# Patient Record
Sex: MALE | Race: OTHER | HISPANIC OR LATINO | URBAN - METROPOLITAN AREA
[De-identification: names, ages, dates, MRNs, and addresses within clinical notes are randomized per-mention and may not be internally consistent; named-entity substitution may affect disease eponyms.]

---

## 2020-01-01 ENCOUNTER — INPATIENT (INPATIENT)
Facility: HOSPITAL | Age: 65
LOS: 5 days | DRG: 870 | End: 2020-03-27
Attending: SURGERY | Admitting: SURGERY
Payer: MEDICAID

## 2020-01-01 VITALS
OXYGEN SATURATION: 92 % | HEIGHT: 68 IN | SYSTOLIC BLOOD PRESSURE: 155 MMHG | DIASTOLIC BLOOD PRESSURE: 76 MMHG | WEIGHT: 160.06 LBS | TEMPERATURE: 100 F | RESPIRATION RATE: 36 BRPM | HEART RATE: 98 BPM

## 2020-01-01 VITALS — TEMPERATURE: 101 F

## 2020-01-01 DIAGNOSIS — E44.0 MODERATE PROTEIN-CALORIE MALNUTRITION: ICD-10-CM

## 2020-01-01 DIAGNOSIS — J96.01 ACUTE RESPIRATORY FAILURE WITH HYPOXIA: ICD-10-CM

## 2020-01-01 DIAGNOSIS — N17.9 ACUTE KIDNEY FAILURE, UNSPECIFIED: ICD-10-CM

## 2020-01-01 DIAGNOSIS — J18.9 PNEUMONIA, UNSPECIFIED ORGANISM: ICD-10-CM

## 2020-01-01 DIAGNOSIS — A41.9 SEPSIS, UNSPECIFIED ORGANISM: ICD-10-CM

## 2020-01-01 DIAGNOSIS — Z51.5 ENCOUNTER FOR PALLIATIVE CARE: ICD-10-CM

## 2020-01-01 DIAGNOSIS — Z29.9 ENCOUNTER FOR PROPHYLACTIC MEASURES, UNSPECIFIED: ICD-10-CM

## 2020-01-01 DIAGNOSIS — Z71.89 OTHER SPECIFIED COUNSELING: ICD-10-CM

## 2020-01-01 DIAGNOSIS — R53.81 OTHER MALAISE: ICD-10-CM

## 2020-01-01 DIAGNOSIS — I50.9 HEART FAILURE, UNSPECIFIED: ICD-10-CM

## 2020-01-01 DIAGNOSIS — E43 UNSPECIFIED SEVERE PROTEIN-CALORIE MALNUTRITION: ICD-10-CM

## 2020-01-01 LAB
4/8 RATIO: 5.71 RATIO — HIGH (ref 0.9–3.6)
ABS CD8: 50 /UL — LOW (ref 142–740)
ALBUMIN SERPL ELPH-MCNC: 1.2 G/DL — LOW (ref 3.5–5)
ALBUMIN SERPL ELPH-MCNC: 1.5 G/DL — LOW (ref 3.5–5)
ALBUMIN SERPL ELPH-MCNC: 1.6 G/DL — LOW (ref 3.5–5)
ALBUMIN SERPL ELPH-MCNC: 1.6 G/DL — LOW (ref 3.5–5)
ALBUMIN SERPL ELPH-MCNC: 1.9 G/DL — LOW (ref 3.5–5)
ALBUMIN SERPL ELPH-MCNC: 2.6 G/DL — LOW (ref 3.5–5)
ALP SERPL-CCNC: 163 U/L — HIGH (ref 40–120)
ALP SERPL-CCNC: 175 U/L — HIGH (ref 40–120)
ALP SERPL-CCNC: 184 U/L — HIGH (ref 40–120)
ALP SERPL-CCNC: 188 U/L — HIGH (ref 40–120)
ALP SERPL-CCNC: 213 U/L — HIGH (ref 40–120)
ALP SERPL-CCNC: 68 U/L — SIGNIFICANT CHANGE UP (ref 40–120)
ALT FLD-CCNC: 1042 U/L DA — HIGH (ref 10–60)
ALT FLD-CCNC: 1648 U/L DA — HIGH (ref 10–60)
ALT FLD-CCNC: 25 U/L DA — SIGNIFICANT CHANGE UP (ref 10–60)
ALT FLD-CCNC: 2807 U/L DA — HIGH (ref 10–60)
ALT FLD-CCNC: 760 U/L DA — HIGH (ref 10–60)
ALT FLD-CCNC: 803 U/L DA — HIGH (ref 10–60)
ANION GAP SERPL CALC-SCNC: 10 MMOL/L — SIGNIFICANT CHANGE UP (ref 5–17)
ANION GAP SERPL CALC-SCNC: 12 MMOL/L — SIGNIFICANT CHANGE UP (ref 5–17)
ANION GAP SERPL CALC-SCNC: 13 MMOL/L — SIGNIFICANT CHANGE UP (ref 5–17)
ANION GAP SERPL CALC-SCNC: 16 MMOL/L — SIGNIFICANT CHANGE UP (ref 5–17)
ANION GAP SERPL CALC-SCNC: 18 MMOL/L — HIGH (ref 5–17)
ANION GAP SERPL CALC-SCNC: 20 MMOL/L — HIGH (ref 5–17)
ANION GAP SERPL CALC-SCNC: 29 MMOL/L — HIGH (ref 5–17)
ANION GAP SERPL CALC-SCNC: 9 MMOL/L — SIGNIFICANT CHANGE UP (ref 5–17)
APPEARANCE UR: CLEAR — SIGNIFICANT CHANGE UP
APTT BLD: 29.8 SEC — SIGNIFICANT CHANGE UP (ref 27.5–36.3)
APTT BLD: 29.9 SEC — SIGNIFICANT CHANGE UP (ref 27.5–36.3)
APTT BLD: 34.9 SEC — SIGNIFICANT CHANGE UP (ref 27.5–36.3)
AST SERPL-CCNC: 1070 U/L — HIGH (ref 10–40)
AST SERPL-CCNC: 300 U/L — HIGH (ref 10–40)
AST SERPL-CCNC: 32 U/L — SIGNIFICANT CHANGE UP (ref 10–40)
AST SERPL-CCNC: 321 U/L — HIGH (ref 10–40)
AST SERPL-CCNC: 5550 U/L — HIGH (ref 10–40)
AST SERPL-CCNC: 829 U/L — HIGH (ref 10–40)
BASE EXCESS BLDA CALC-SCNC: -10.2 MMOL/L — LOW (ref -2–2)
BASE EXCESS BLDA CALC-SCNC: -10.6 MMOL/L — LOW (ref -2–2)
BASE EXCESS BLDA CALC-SCNC: -10.7 MMOL/L — LOW (ref -2–2)
BASE EXCESS BLDA CALC-SCNC: -10.7 MMOL/L — LOW (ref -2–2)
BASE EXCESS BLDA CALC-SCNC: -11.7 MMOL/L — LOW (ref -2–2)
BASE EXCESS BLDA CALC-SCNC: -12.3 MMOL/L — LOW (ref -2–2)
BASE EXCESS BLDA CALC-SCNC: -7.9 MMOL/L — LOW (ref -2–2)
BASE EXCESS BLDA CALC-SCNC: -9.1 MMOL/L — LOW (ref -2–2)
BASE EXCESS BLDA CALC-SCNC: -9.6 MMOL/L — LOW (ref -2–2)
BASE EXCESS BLDA CALC-SCNC: -9.7 MMOL/L — LOW (ref -2–2)
BASE EXCESS BLDV CALC-SCNC: -14 MMOL/L — LOW (ref -2–2)
BASE EXCESS BLDV CALC-SCNC: 3.8 MMOL/L — HIGH (ref -2–2)
BASOPHILS # BLD AUTO: 0 K/UL — SIGNIFICANT CHANGE UP (ref 0–0.2)
BASOPHILS # BLD AUTO: 0 K/UL — SIGNIFICANT CHANGE UP (ref 0–0.2)
BASOPHILS # BLD AUTO: 0.01 K/UL — SIGNIFICANT CHANGE UP (ref 0–0.2)
BASOPHILS # BLD AUTO: 0.01 K/UL — SIGNIFICANT CHANGE UP (ref 0–0.2)
BASOPHILS # BLD AUTO: SIGNIFICANT CHANGE UP K/UL (ref 0–0.2)
BASOPHILS # BLD AUTO: SIGNIFICANT CHANGE UP K/UL (ref 0–0.2)
BASOPHILS NFR BLD AUTO: 0 % — SIGNIFICANT CHANGE UP (ref 0–2)
BASOPHILS NFR BLD AUTO: 0 % — SIGNIFICANT CHANGE UP (ref 0–2)
BASOPHILS NFR BLD AUTO: 0.1 % — SIGNIFICANT CHANGE UP (ref 0–2)
BASOPHILS NFR BLD AUTO: 0.1 % — SIGNIFICANT CHANGE UP (ref 0–2)
BASOPHILS NFR BLD AUTO: SIGNIFICANT CHANGE UP % (ref 0–2)
BASOPHILS NFR BLD AUTO: SIGNIFICANT CHANGE UP % (ref 0–2)
BILIRUB SERPL-MCNC: 0.4 MG/DL — SIGNIFICANT CHANGE UP (ref 0.2–1.2)
BILIRUB SERPL-MCNC: 0.5 MG/DL — SIGNIFICANT CHANGE UP (ref 0.2–1.2)
BILIRUB SERPL-MCNC: 0.5 MG/DL — SIGNIFICANT CHANGE UP (ref 0.2–1.2)
BILIRUB SERPL-MCNC: 0.6 MG/DL — SIGNIFICANT CHANGE UP (ref 0.2–1.2)
BILIRUB SERPL-MCNC: 0.8 MG/DL — SIGNIFICANT CHANGE UP (ref 0.2–1.2)
BILIRUB SERPL-MCNC: 0.9 MG/DL — SIGNIFICANT CHANGE UP (ref 0.2–1.2)
BILIRUB UR-MCNC: NEGATIVE — SIGNIFICANT CHANGE UP
BLOOD GAS COMMENTS ARTERIAL: SIGNIFICANT CHANGE UP
BLOOD GAS COMMENTS, VENOUS: SIGNIFICANT CHANGE UP
BLOOD GAS COMMENTS, VENOUS: SIGNIFICANT CHANGE UP
BUN SERPL-MCNC: 18 MG/DL — SIGNIFICANT CHANGE UP (ref 7–18)
BUN SERPL-MCNC: 21 MG/DL — HIGH (ref 7–18)
BUN SERPL-MCNC: 36 MG/DL — HIGH (ref 7–18)
BUN SERPL-MCNC: 39 MG/DL — HIGH (ref 7–18)
BUN SERPL-MCNC: 55 MG/DL — HIGH (ref 7–18)
BUN SERPL-MCNC: 57 MG/DL — HIGH (ref 7–18)
BUN SERPL-MCNC: 71 MG/DL — HIGH (ref 7–18)
BUN SERPL-MCNC: 75 MG/DL — HIGH (ref 7–18)
CALCIUM SERPL-MCNC: 10.3 MG/DL — SIGNIFICANT CHANGE UP (ref 8.4–10.5)
CALCIUM SERPL-MCNC: 6.6 MG/DL — LOW (ref 8.4–10.5)
CALCIUM SERPL-MCNC: 6.6 MG/DL — LOW (ref 8.4–10.5)
CALCIUM SERPL-MCNC: 6.7 MG/DL — LOW (ref 8.4–10.5)
CALCIUM SERPL-MCNC: 6.7 MG/DL — LOW (ref 8.4–10.5)
CALCIUM SERPL-MCNC: 7 MG/DL — LOW (ref 8.4–10.5)
CALCIUM SERPL-MCNC: 7 MG/DL — LOW (ref 8.4–10.5)
CALCIUM SERPL-MCNC: 8.5 MG/DL — SIGNIFICANT CHANGE UP (ref 8.4–10.5)
CD3 BLASTS SPEC-ACNC: 360 /UL — LOW (ref 672–1870)
CD3 BLASTS SPEC-ACNC: 71 % — SIGNIFICANT CHANGE UP (ref 59–83)
CD4 %: 57 % — SIGNIFICANT CHANGE UP (ref 30–62)
CD8 %: 10 % — LOW (ref 12–36)
CHLORIDE SERPL-SCNC: 100 MMOL/L — SIGNIFICANT CHANGE UP (ref 96–108)
CHLORIDE SERPL-SCNC: 101 MMOL/L — SIGNIFICANT CHANGE UP (ref 96–108)
CHLORIDE SERPL-SCNC: 104 MMOL/L — SIGNIFICANT CHANGE UP (ref 96–108)
CHLORIDE SERPL-SCNC: 108 MMOL/L — SIGNIFICANT CHANGE UP (ref 96–108)
CHLORIDE SERPL-SCNC: 115 MMOL/L — HIGH (ref 96–108)
CHLORIDE SERPL-SCNC: 116 MMOL/L — HIGH (ref 96–108)
CHLORIDE SERPL-SCNC: 116 MMOL/L — HIGH (ref 96–108)
CHLORIDE SERPL-SCNC: 98 MMOL/L — SIGNIFICANT CHANGE UP (ref 96–108)
CHOLEST SERPL-MCNC: 108 MG/DL — SIGNIFICANT CHANGE UP (ref 10–199)
CK MB BLD-MCNC: 1.1 % — SIGNIFICANT CHANGE UP (ref 0–3.5)
CK MB BLD-MCNC: 1.3 % — SIGNIFICANT CHANGE UP (ref 0–3.5)
CK MB CFR SERPL CALC: 11.3 NG/ML — HIGH (ref 0–3.6)
CK MB CFR SERPL CALC: 9.1 NG/ML — HIGH (ref 0–3.6)
CK SERPL-CCNC: 250 U/L — HIGH (ref 35–232)
CK SERPL-CCNC: 815 U/L — HIGH (ref 35–232)
CK SERPL-CCNC: 832 U/L — HIGH (ref 35–232)
CK SERPL-CCNC: 876 U/L — HIGH (ref 35–232)
CO2 SERPL-SCNC: 15 MMOL/L — LOW (ref 22–31)
CO2 SERPL-SCNC: 16 MMOL/L — LOW (ref 22–31)
CO2 SERPL-SCNC: 17 MMOL/L — LOW (ref 22–31)
CO2 SERPL-SCNC: 18 MMOL/L — LOW (ref 22–31)
CO2 SERPL-SCNC: 27 MMOL/L — SIGNIFICANT CHANGE UP (ref 22–31)
CO2 SERPL-SCNC: 9 MMOL/L — CRITICAL LOW (ref 22–31)
COLOR SPEC: YELLOW — SIGNIFICANT CHANGE UP
CREAT ?TM UR-MCNC: 209 MG/DL — SIGNIFICANT CHANGE UP
CREAT SERPL-MCNC: 1.06 MG/DL — SIGNIFICANT CHANGE UP (ref 0.5–1.3)
CREAT SERPL-MCNC: 1.77 MG/DL — HIGH (ref 0.5–1.3)
CREAT SERPL-MCNC: 3.8 MG/DL — HIGH (ref 0.5–1.3)
CREAT SERPL-MCNC: 4.83 MG/DL — HIGH (ref 0.5–1.3)
CREAT SERPL-MCNC: 7.3 MG/DL — HIGH (ref 0.5–1.3)
CREAT SERPL-MCNC: 7.56 MG/DL — HIGH (ref 0.5–1.3)
CREAT SERPL-MCNC: 9.44 MG/DL — HIGH (ref 0.5–1.3)
CREAT SERPL-MCNC: 9.78 MG/DL — HIGH (ref 0.5–1.3)
CRP SERPL-MCNC: 36.39 MG/DL — HIGH (ref 0–0.4)
CRP SERPL-MCNC: 36.82 MG/DL — HIGH (ref 0–0.4)
CRP SERPL-MCNC: 49.23 MG/DL — HIGH (ref 0–0.4)
CRP SERPL-MCNC: 50.72 MG/DL — HIGH (ref 0–0.4)
CULTURE RESULTS: SIGNIFICANT CHANGE UP
D DIMER BLD IA.RAPID-MCNC: 8250 NG/ML DDU — HIGH
DIFF PNL FLD: ABNORMAL
EOSINOPHIL # BLD AUTO: 0 K/UL — SIGNIFICANT CHANGE UP (ref 0–0.5)
EOSINOPHIL # BLD AUTO: 0 K/UL — SIGNIFICANT CHANGE UP (ref 0–0.5)
EOSINOPHIL # BLD AUTO: 0.01 K/UL — SIGNIFICANT CHANGE UP (ref 0–0.5)
EOSINOPHIL # BLD AUTO: 0.08 K/UL — SIGNIFICANT CHANGE UP (ref 0–0.5)
EOSINOPHIL # BLD AUTO: SIGNIFICANT CHANGE UP K/UL (ref 0–0.5)
EOSINOPHIL # BLD AUTO: SIGNIFICANT CHANGE UP K/UL (ref 0–0.5)
EOSINOPHIL NFR BLD AUTO: 0 % — SIGNIFICANT CHANGE UP (ref 0–6)
EOSINOPHIL NFR BLD AUTO: 0 % — SIGNIFICANT CHANGE UP (ref 0–6)
EOSINOPHIL NFR BLD AUTO: 0.1 % — SIGNIFICANT CHANGE UP (ref 0–6)
EOSINOPHIL NFR BLD AUTO: 0.9 % — SIGNIFICANT CHANGE UP (ref 0–6)
EOSINOPHIL NFR BLD AUTO: SIGNIFICANT CHANGE UP % (ref 0–6)
EOSINOPHIL NFR BLD AUTO: SIGNIFICANT CHANGE UP % (ref 0–6)
ERYTHROCYTE [SEDIMENTATION RATE] IN BLOOD: 86 MM/HR — HIGH (ref 0–20)
FERRITIN SERPL-MCNC: 8287 NG/ML — HIGH (ref 30–400)
FERRITIN SERPL-MCNC: HIGH NG/ML (ref 30–400)
FLU A RESULT: SIGNIFICANT CHANGE UP
FLU A RESULT: SIGNIFICANT CHANGE UP
FLUAV AG NPH QL: SIGNIFICANT CHANGE UP
FLUBV AG NPH QL: SIGNIFICANT CHANGE UP
FOLATE SERPL-MCNC: >20 NG/ML — SIGNIFICANT CHANGE UP
GLUCOSE BLDC GLUCOMTR-MCNC: 107 MG/DL — HIGH (ref 70–99)
GLUCOSE BLDC GLUCOMTR-MCNC: 180 MG/DL — HIGH (ref 70–99)
GLUCOSE SERPL-MCNC: 103 MG/DL — HIGH (ref 70–99)
GLUCOSE SERPL-MCNC: 123 MG/DL — HIGH (ref 70–99)
GLUCOSE SERPL-MCNC: 130 MG/DL — HIGH (ref 70–99)
GLUCOSE SERPL-MCNC: 132 MG/DL — HIGH (ref 70–99)
GLUCOSE SERPL-MCNC: 139 MG/DL — HIGH (ref 70–99)
GLUCOSE SERPL-MCNC: 141 MG/DL — HIGH (ref 70–99)
GLUCOSE SERPL-MCNC: 145 MG/DL — HIGH (ref 70–99)
GLUCOSE SERPL-MCNC: 61 MG/DL — LOW (ref 70–99)
GLUCOSE UR QL: NEGATIVE — SIGNIFICANT CHANGE UP
HBA1C BLD-MCNC: 6.5 % — HIGH (ref 4–5.6)
HBV SURFACE AG SER-ACNC: SIGNIFICANT CHANGE UP
HCO3 BLDA-SCNC: 15 MMOL/L — LOW (ref 23–27)
HCO3 BLDA-SCNC: 16 MMOL/L — LOW (ref 23–27)
HCO3 BLDA-SCNC: 16 MMOL/L — LOW (ref 23–27)
HCO3 BLDA-SCNC: 17 MMOL/L — LOW (ref 23–27)
HCO3 BLDA-SCNC: 18 MMOL/L — LOW (ref 23–27)
HCO3 BLDV-SCNC: 11 MMOL/L — LOW (ref 21–29)
HCO3 BLDV-SCNC: 28 MMOL/L — SIGNIFICANT CHANGE UP (ref 21–29)
HCT VFR BLD CALC: 35.1 % — LOW (ref 39–50)
HCT VFR BLD CALC: 36.9 % — LOW (ref 39–50)
HCT VFR BLD CALC: 37.8 % — LOW (ref 39–50)
HCT VFR BLD CALC: 39 % — SIGNIFICANT CHANGE UP (ref 39–50)
HCT VFR BLD CALC: 40.8 % — SIGNIFICANT CHANGE UP (ref 39–50)
HCT VFR BLD CALC: 41.8 % — SIGNIFICANT CHANGE UP (ref 39–50)
HCT VFR BLD CALC: 42.6 % — SIGNIFICANT CHANGE UP (ref 39–50)
HDLC SERPL-MCNC: 37 MG/DL — LOW
HGB BLD-MCNC: 10.6 G/DL — LOW (ref 13–17)
HGB BLD-MCNC: 11.5 G/DL — LOW (ref 13–17)
HGB BLD-MCNC: 11.7 G/DL — LOW (ref 13–17)
HGB BLD-MCNC: 12.7 G/DL — LOW (ref 13–17)
HGB BLD-MCNC: 12.8 G/DL — LOW (ref 13–17)
HGB BLD-MCNC: 13 G/DL — SIGNIFICANT CHANGE UP (ref 13–17)
HGB BLD-MCNC: 13.4 G/DL — SIGNIFICANT CHANGE UP (ref 13–17)
HOROWITZ INDEX BLDA+IHG-RTO: 100 — SIGNIFICANT CHANGE UP
HOROWITZ INDEX BLDA+IHG-RTO: 40 — SIGNIFICANT CHANGE UP
HOROWITZ INDEX BLDA+IHG-RTO: 50 — SIGNIFICANT CHANGE UP
HOROWITZ INDEX BLDA+IHG-RTO: 50 — SIGNIFICANT CHANGE UP
HOROWITZ INDEX BLDA+IHG-RTO: 60 — SIGNIFICANT CHANGE UP
HOROWITZ INDEX BLDV+IHG-RTO: 100 — SIGNIFICANT CHANGE UP
HOROWITZ INDEX BLDV+IHG-RTO: 21 — SIGNIFICANT CHANGE UP
IMM GRANULOCYTES NFR BLD AUTO: 0.7 % — SIGNIFICANT CHANGE UP (ref 0–1.5)
IMM GRANULOCYTES NFR BLD AUTO: 1.4 % — SIGNIFICANT CHANGE UP (ref 0–1.5)
IMM GRANULOCYTES NFR BLD AUTO: SIGNIFICANT CHANGE UP % (ref 0–1.5)
IMM GRANULOCYTES NFR BLD AUTO: SIGNIFICANT CHANGE UP % (ref 0–1.5)
INR BLD: 1.22 RATIO — HIGH (ref 0.88–1.16)
INR BLD: 1.29 RATIO — HIGH (ref 0.88–1.16)
INR BLD: 1.63 RATIO — HIGH (ref 0.88–1.16)
KETONES UR-MCNC: ABNORMAL
LACTATE SERPL-SCNC: 1.3 MMOL/L — SIGNIFICANT CHANGE UP (ref 0.7–2)
LACTATE SERPL-SCNC: 11.8 MMOL/L — CRITICAL HIGH (ref 0.7–2)
LACTATE SERPL-SCNC: 2.9 MMOL/L — HIGH (ref 0.7–2)
LDH SERPL L TO P-CCNC: 788 U/L — HIGH (ref 120–225)
LDH SERPL L TO P-CCNC: >4000 U/L — HIGH (ref 120–225)
LEGIONELLA AG UR QL: NEGATIVE — SIGNIFICANT CHANGE UP
LEUKOCYTE ESTERASE UR-ACNC: NEGATIVE — SIGNIFICANT CHANGE UP
LIPID PNL WITH DIRECT LDL SERPL: 52 MG/DL — SIGNIFICANT CHANGE UP
LYMPHOCYTES # BLD AUTO: 0.31 K/UL — LOW (ref 1–3.3)
LYMPHOCYTES # BLD AUTO: 0.64 K/UL — LOW (ref 1–3.3)
LYMPHOCYTES # BLD AUTO: 0.82 K/UL — LOW (ref 1–3.3)
LYMPHOCYTES # BLD AUTO: 1.75 K/UL — SIGNIFICANT CHANGE UP (ref 1–3.3)
LYMPHOCYTES # BLD AUTO: 17 % — SIGNIFICANT CHANGE UP (ref 13–44)
LYMPHOCYTES # BLD AUTO: 3 % — LOW (ref 13–44)
LYMPHOCYTES # BLD AUTO: 7 % — LOW (ref 13–44)
LYMPHOCYTES # BLD AUTO: 8.8 % — LOW (ref 13–44)
LYMPHOCYTES # BLD AUTO: SIGNIFICANT CHANGE UP % (ref 13–44)
LYMPHOCYTES # BLD AUTO: SIGNIFICANT CHANGE UP % (ref 13–44)
LYMPHOCYTES # BLD AUTO: SIGNIFICANT CHANGE UP K/UL (ref 1–3.3)
LYMPHOCYTES # BLD AUTO: SIGNIFICANT CHANGE UP K/UL (ref 1–3.3)
M PNEUMO IGM SER-ACNC: 61 UNITS/ML — SIGNIFICANT CHANGE UP
MAGNESIUM SERPL-MCNC: 2 MG/DL — SIGNIFICANT CHANGE UP (ref 1.6–2.6)
MAGNESIUM SERPL-MCNC: 2.2 MG/DL — SIGNIFICANT CHANGE UP (ref 1.6–2.6)
MAGNESIUM SERPL-MCNC: 2.3 MG/DL — SIGNIFICANT CHANGE UP (ref 1.6–2.6)
MAGNESIUM SERPL-MCNC: 2.4 MG/DL — SIGNIFICANT CHANGE UP (ref 1.6–2.6)
MAGNESIUM SERPL-MCNC: 2.5 MG/DL — SIGNIFICANT CHANGE UP (ref 1.6–2.6)
MAGNESIUM SERPL-MCNC: 3.2 MG/DL — HIGH (ref 1.6–2.6)
MAGNESIUM SERPL-MCNC: 4.1 MG/DL — HIGH (ref 1.6–2.6)
MANUAL SMEAR VERIFICATION: SIGNIFICANT CHANGE UP
MCHC RBC-ENTMCNC: 29.8 GM/DL — LOW (ref 32–36)
MCHC RBC-ENTMCNC: 30.2 GM/DL — LOW (ref 32–36)
MCHC RBC-ENTMCNC: 30.4 GM/DL — LOW (ref 32–36)
MCHC RBC-ENTMCNC: 30.9 PG — SIGNIFICANT CHANGE UP (ref 27–34)
MCHC RBC-ENTMCNC: 31.1 GM/DL — LOW (ref 32–36)
MCHC RBC-ENTMCNC: 31.4 PG — SIGNIFICANT CHANGE UP (ref 27–34)
MCHC RBC-ENTMCNC: 31.6 PG — SIGNIFICANT CHANGE UP (ref 27–34)
MCHC RBC-ENTMCNC: 31.7 GM/DL — LOW (ref 32–36)
MCHC RBC-ENTMCNC: 31.7 PG — SIGNIFICANT CHANGE UP (ref 27–34)
MCHC RBC-ENTMCNC: 31.9 PG — SIGNIFICANT CHANGE UP (ref 27–34)
MCHC RBC-ENTMCNC: 32.1 PG — SIGNIFICANT CHANGE UP (ref 27–34)
MCHC RBC-ENTMCNC: 32.3 PG — SIGNIFICANT CHANGE UP (ref 27–34)
MCHC RBC-ENTMCNC: 32.8 GM/DL — SIGNIFICANT CHANGE UP (ref 32–36)
MCHC RBC-ENTMCNC: 32.8 GM/DL — SIGNIFICANT CHANGE UP (ref 32–36)
MCV RBC AUTO: 101.7 FL — HIGH (ref 80–100)
MCV RBC AUTO: 101.9 FL — HIGH (ref 80–100)
MCV RBC AUTO: 103.3 FL — HIGH (ref 80–100)
MCV RBC AUTO: 103.6 FL — HIGH (ref 80–100)
MCV RBC AUTO: 105.7 FL — HIGH (ref 80–100)
MCV RBC AUTO: 96.5 FL — SIGNIFICANT CHANGE UP (ref 80–100)
MCV RBC AUTO: 97.7 FL — SIGNIFICANT CHANGE UP (ref 80–100)
MONOCYTES # BLD AUTO: 0.21 K/UL — SIGNIFICANT CHANGE UP (ref 0–0.9)
MONOCYTES # BLD AUTO: 0.31 K/UL — SIGNIFICANT CHANGE UP (ref 0–0.9)
MONOCYTES # BLD AUTO: 0.43 K/UL — SIGNIFICANT CHANGE UP (ref 0–0.9)
MONOCYTES # BLD AUTO: 0.51 K/UL — SIGNIFICANT CHANGE UP (ref 0–0.9)
MONOCYTES # BLD AUTO: SIGNIFICANT CHANGE UP K/UL (ref 0–0.9)
MONOCYTES # BLD AUTO: SIGNIFICANT CHANGE UP K/UL (ref 0–0.9)
MONOCYTES NFR BLD AUTO: 2 % — SIGNIFICANT CHANGE UP (ref 2–14)
MONOCYTES NFR BLD AUTO: 3.3 % — SIGNIFICANT CHANGE UP (ref 2–14)
MONOCYTES NFR BLD AUTO: 4.7 % — SIGNIFICANT CHANGE UP (ref 2–14)
MONOCYTES NFR BLD AUTO: 5 % — SIGNIFICANT CHANGE UP (ref 2–14)
MONOCYTES NFR BLD AUTO: SIGNIFICANT CHANGE UP % (ref 2–14)
MONOCYTES NFR BLD AUTO: SIGNIFICANT CHANGE UP % (ref 2–14)
MRSA PCR RESULT.: SIGNIFICANT CHANGE UP
MYCOPLASMA AG SPEC QL: NEGATIVE — SIGNIFICANT CHANGE UP
NEUTROPHILS # BLD AUTO: 8 K/UL — HIGH (ref 1.8–7.4)
NEUTROPHILS # BLD AUTO: 8.01 K/UL — HIGH (ref 1.8–7.4)
NEUTROPHILS # BLD AUTO: 8.03 K/UL — HIGH (ref 1.8–7.4)
NEUTROPHILS # BLD AUTO: 9.81 K/UL — HIGH (ref 1.8–7.4)
NEUTROPHILS # BLD AUTO: SIGNIFICANT CHANGE UP K/UL (ref 1.8–7.4)
NEUTROPHILS # BLD AUTO: SIGNIFICANT CHANGE UP K/UL (ref 1.8–7.4)
NEUTROPHILS NFR BLD AUTO: 78 % — HIGH (ref 43–77)
NEUTROPHILS NFR BLD AUTO: 85.5 % — HIGH (ref 43–77)
NEUTROPHILS NFR BLD AUTO: 87.4 % — HIGH (ref 43–77)
NEUTROPHILS NFR BLD AUTO: 93 % — HIGH (ref 43–77)
NEUTROPHILS NFR BLD AUTO: SIGNIFICANT CHANGE UP % (ref 43–77)
NEUTROPHILS NFR BLD AUTO: SIGNIFICANT CHANGE UP % (ref 43–77)
NITRITE UR-MCNC: NEGATIVE — SIGNIFICANT CHANGE UP
NRBC # BLD: 0 /100 WBCS — SIGNIFICANT CHANGE UP (ref 0–0)
NRBC # BLD: 0 /100 — SIGNIFICANT CHANGE UP (ref 0–0)
NRBC # BLD: 1 /100 WBCS — HIGH (ref 0–0)
NRBC # BLD: SIGNIFICANT CHANGE UP /100 WBCS (ref 0–0)
NT-PROBNP SERPL-SCNC: 431 PG/ML — HIGH (ref 0–125)
OSMOLALITY UR: 324 MOS/KG — SIGNIFICANT CHANGE UP (ref 50–1200)
PCO2 BLDA: 34 MMHG — SIGNIFICANT CHANGE UP (ref 32–46)
PCO2 BLDA: 38 MMHG — SIGNIFICANT CHANGE UP (ref 32–46)
PCO2 BLDA: 39 MMHG — SIGNIFICANT CHANGE UP (ref 32–46)
PCO2 BLDA: 39 MMHG — SIGNIFICANT CHANGE UP (ref 32–46)
PCO2 BLDA: 40 MMHG — SIGNIFICANT CHANGE UP (ref 32–46)
PCO2 BLDA: 40 MMHG — SIGNIFICANT CHANGE UP (ref 32–46)
PCO2 BLDA: 41 MMHG — SIGNIFICANT CHANGE UP (ref 32–46)
PCO2 BLDA: 45 MMHG — SIGNIFICANT CHANGE UP (ref 32–46)
PCO2 BLDV: 24 MMHG — LOW (ref 35–50)
PCO2 BLDV: 40 MMHG — SIGNIFICANT CHANGE UP (ref 35–50)
PH BLDA: 7.18 — CRITICAL LOW (ref 7.35–7.45)
PH BLDA: 7.19 — CRITICAL LOW (ref 7.35–7.45)
PH BLDA: 7.24 — LOW (ref 7.35–7.45)
PH BLDA: 7.24 — LOW (ref 7.35–7.45)
PH BLDA: 7.25 — LOW (ref 7.35–7.45)
PH BLDA: 7.26 — LOW (ref 7.35–7.45)
PH BLDA: 7.27 — LOW (ref 7.35–7.45)
PH BLDA: 7.27 — LOW (ref 7.35–7.45)
PH BLDA: 7.28 — LOW (ref 7.35–7.45)
PH BLDA: 7.28 — LOW (ref 7.35–7.45)
PH BLDV: 7.29 — LOW (ref 7.35–7.45)
PH BLDV: 7.45 — SIGNIFICANT CHANGE UP (ref 7.35–7.45)
PH UR: 5 — SIGNIFICANT CHANGE UP (ref 5–8)
PHOSPHATE SERPL-MCNC: 15.7 MG/DL — HIGH (ref 2.5–4.5)
PHOSPHATE SERPL-MCNC: 5.6 MG/DL — HIGH (ref 2.5–4.5)
PHOSPHATE SERPL-MCNC: 6.3 MG/DL — HIGH (ref 2.5–4.5)
PHOSPHATE SERPL-MCNC: 7.2 MG/DL — HIGH (ref 2.5–4.5)
PLAT MORPH BLD: NORMAL — SIGNIFICANT CHANGE UP
PLATELET # BLD AUTO: 189 K/UL — SIGNIFICANT CHANGE UP (ref 150–400)
PLATELET # BLD AUTO: 196 K/UL — SIGNIFICANT CHANGE UP (ref 150–400)
PLATELET # BLD AUTO: 212 K/UL — SIGNIFICANT CHANGE UP (ref 150–400)
PLATELET # BLD AUTO: 213 K/UL — SIGNIFICANT CHANGE UP (ref 150–400)
PLATELET # BLD AUTO: 214 K/UL — SIGNIFICANT CHANGE UP (ref 150–400)
PLATELET # BLD AUTO: 215 K/UL — SIGNIFICANT CHANGE UP (ref 150–400)
PLATELET # BLD AUTO: 223 K/UL — SIGNIFICANT CHANGE UP (ref 150–400)
PO2 BLDA: 110 MMHG — HIGH (ref 74–108)
PO2 BLDA: 133 MMHG — HIGH (ref 74–108)
PO2 BLDA: 133 MMHG — HIGH (ref 74–108)
PO2 BLDA: 167 MMHG — HIGH (ref 74–108)
PO2 BLDA: 175 MMHG — HIGH (ref 74–108)
PO2 BLDA: 214 MMHG — HIGH (ref 74–108)
PO2 BLDA: 218 MMHG — HIGH (ref 74–108)
PO2 BLDA: 235 MMHG — HIGH (ref 74–108)
PO2 BLDA: 83 MMHG — SIGNIFICANT CHANGE UP (ref 74–108)
PO2 BLDA: 91 MMHG — SIGNIFICANT CHANGE UP (ref 74–108)
PO2 BLDV: 41 MMHG — SIGNIFICANT CHANGE UP (ref 25–45)
PO2 BLDV: 47 MMHG — HIGH (ref 25–45)
POTASSIUM SERPL-MCNC: 4 MMOL/L — SIGNIFICANT CHANGE UP (ref 3.5–5.3)
POTASSIUM SERPL-MCNC: 4.5 MMOL/L — SIGNIFICANT CHANGE UP (ref 3.5–5.3)
POTASSIUM SERPL-MCNC: 5.2 MMOL/L — SIGNIFICANT CHANGE UP (ref 3.5–5.3)
POTASSIUM SERPL-MCNC: 5.4 MMOL/L — HIGH (ref 3.5–5.3)
POTASSIUM SERPL-MCNC: 5.4 MMOL/L — HIGH (ref 3.5–5.3)
POTASSIUM SERPL-MCNC: 5.9 MMOL/L — HIGH (ref 3.5–5.3)
POTASSIUM SERPL-MCNC: 7.1 MMOL/L — CRITICAL HIGH (ref 3.5–5.3)
POTASSIUM SERPL-MCNC: 7.8 MMOL/L — CRITICAL HIGH (ref 3.5–5.3)
POTASSIUM SERPL-SCNC: 4 MMOL/L — SIGNIFICANT CHANGE UP (ref 3.5–5.3)
POTASSIUM SERPL-SCNC: 4.5 MMOL/L — SIGNIFICANT CHANGE UP (ref 3.5–5.3)
POTASSIUM SERPL-SCNC: 5.2 MMOL/L — SIGNIFICANT CHANGE UP (ref 3.5–5.3)
POTASSIUM SERPL-SCNC: 5.4 MMOL/L — HIGH (ref 3.5–5.3)
POTASSIUM SERPL-SCNC: 5.4 MMOL/L — HIGH (ref 3.5–5.3)
POTASSIUM SERPL-SCNC: 5.9 MMOL/L — HIGH (ref 3.5–5.3)
POTASSIUM SERPL-SCNC: 7.1 MMOL/L — CRITICAL HIGH (ref 3.5–5.3)
POTASSIUM SERPL-SCNC: 7.8 MMOL/L — CRITICAL HIGH (ref 3.5–5.3)
PROT SERPL-MCNC: 5.5 G/DL — LOW (ref 6–8.3)
PROT SERPL-MCNC: 6.1 G/DL — SIGNIFICANT CHANGE UP (ref 6–8.3)
PROT SERPL-MCNC: 6.3 G/DL — SIGNIFICANT CHANGE UP (ref 6–8.3)
PROT SERPL-MCNC: 6.3 G/DL — SIGNIFICANT CHANGE UP (ref 6–8.3)
PROT SERPL-MCNC: 7.1 G/DL — SIGNIFICANT CHANGE UP (ref 6–8.3)
PROT SERPL-MCNC: 7.8 G/DL — SIGNIFICANT CHANGE UP (ref 6–8.3)
PROT UR-MCNC: 100
PROTHROM AB SERPL-ACNC: 13.9 SEC — HIGH (ref 10–12.9)
PROTHROM AB SERPL-ACNC: 14.7 SEC — HIGH (ref 10–12.9)
PROTHROM AB SERPL-ACNC: 18.7 SEC — HIGH (ref 10–12.9)
RBC # BLD: 3.32 M/UL — LOW (ref 4.2–5.8)
RBC # BLD: 3.62 M/UL — LOW (ref 4.2–5.8)
RBC # BLD: 3.66 M/UL — LOW (ref 4.2–5.8)
RBC # BLD: 3.99 M/UL — LOW (ref 4.2–5.8)
RBC # BLD: 4.11 M/UL — LOW (ref 4.2–5.8)
RBC # BLD: 4.11 M/UL — LOW (ref 4.2–5.8)
RBC # BLD: 4.23 M/UL — SIGNIFICANT CHANGE UP (ref 4.2–5.8)
RBC # FLD: 12.9 % — SIGNIFICANT CHANGE UP (ref 10.3–14.5)
RBC # FLD: 13.4 % — SIGNIFICANT CHANGE UP (ref 10.3–14.5)
RBC # FLD: 14 % — SIGNIFICANT CHANGE UP (ref 10.3–14.5)
RBC # FLD: 14.1 % — SIGNIFICANT CHANGE UP (ref 10.3–14.5)
RBC # FLD: 14.2 % — SIGNIFICANT CHANGE UP (ref 10.3–14.5)
RBC # FLD: 14.3 % — SIGNIFICANT CHANGE UP (ref 10.3–14.5)
RBC # FLD: 14.9 % — HIGH (ref 10.3–14.5)
RBC BLD AUTO: NORMAL — SIGNIFICANT CHANGE UP
RSV RESULT: SIGNIFICANT CHANGE UP
RSV RNA RESP QL NAA+PROBE: SIGNIFICANT CHANGE UP
S AUREUS DNA NOSE QL NAA+PROBE: DETECTED
SAO2 % BLDA: 94 % — SIGNIFICANT CHANGE UP (ref 92–96)
SAO2 % BLDA: 96 % — SIGNIFICANT CHANGE UP (ref 92–96)
SAO2 % BLDA: 97 % — HIGH (ref 92–96)
SAO2 % BLDA: 98 % — HIGH (ref 92–96)
SAO2 % BLDA: 98 % — HIGH (ref 92–96)
SAO2 % BLDA: 99 % — HIGH (ref 92–96)
SAO2 % BLDV: 73 % — SIGNIFICANT CHANGE UP (ref 67–88)
SAO2 % BLDV: 75 % — SIGNIFICANT CHANGE UP (ref 67–88)
SARS-COV-2 RNA SPEC QL NAA+PROBE: DETECTED
SODIUM SERPL-SCNC: 131 MMOL/L — LOW (ref 135–145)
SODIUM SERPL-SCNC: 136 MMOL/L — SIGNIFICANT CHANGE UP (ref 135–145)
SODIUM SERPL-SCNC: 139 MMOL/L — SIGNIFICANT CHANGE UP (ref 135–145)
SODIUM SERPL-SCNC: 140 MMOL/L — SIGNIFICANT CHANGE UP (ref 135–145)
SODIUM SERPL-SCNC: 140 MMOL/L — SIGNIFICANT CHANGE UP (ref 135–145)
SODIUM SERPL-SCNC: 144 MMOL/L — SIGNIFICANT CHANGE UP (ref 135–145)
SODIUM SERPL-SCNC: 144 MMOL/L — SIGNIFICANT CHANGE UP (ref 135–145)
SODIUM SERPL-SCNC: 145 MMOL/L — SIGNIFICANT CHANGE UP (ref 135–145)
SODIUM UR-SCNC: 39 MMOL/L — SIGNIFICANT CHANGE UP
SP GR SPEC: 1.02 — SIGNIFICANT CHANGE UP (ref 1.01–1.02)
SPECIMEN SOURCE: SIGNIFICANT CHANGE UP
T-CELL CD4 SUBSET PNL BLD: 287 /UL — LOW (ref 489–1457)
TOTAL CHOLESTEROL/HDL RATIO MEASUREMENT: 2.9 RATIO — LOW (ref 3.4–9.6)
TRIGL SERPL-MCNC: 95 MG/DL — SIGNIFICANT CHANGE UP (ref 10–149)
TROPONIN I SERPL-MCNC: 0.02 NG/ML — SIGNIFICANT CHANGE UP (ref 0–0.04)
TROPONIN I SERPL-MCNC: 0.49 NG/ML — HIGH (ref 0–0.04)
TROPONIN I SERPL-MCNC: 10.5 NG/ML — HIGH (ref 0–0.04)
TROPONIN I SERPL-MCNC: 12.2 NG/ML — HIGH (ref 0–0.04)
TROPONIN I SERPL-MCNC: 9.43 NG/ML — HIGH (ref 0–0.04)
TSH SERPL-MCNC: 0.83 UU/ML — SIGNIFICANT CHANGE UP (ref 0.34–4.82)
UROBILINOGEN FLD QL: NEGATIVE — SIGNIFICANT CHANGE UP
VIT B12 SERPL-MCNC: >2000 PG/ML — HIGH (ref 232–1245)
WBC # BLD: 10.13 K/UL — SIGNIFICANT CHANGE UP (ref 3.8–10.5)
WBC # BLD: 10.27 K/UL — SIGNIFICANT CHANGE UP (ref 3.8–10.5)
WBC # BLD: 10.33 K/UL — SIGNIFICANT CHANGE UP (ref 3.8–10.5)
WBC # BLD: 13.47 K/UL — HIGH (ref 3.8–10.5)
WBC # BLD: 9.18 K/UL — SIGNIFICANT CHANGE UP (ref 3.8–10.5)
WBC # BLD: 9.35 K/UL — SIGNIFICANT CHANGE UP (ref 3.8–10.5)
WBC # BLD: 9.79 K/UL — SIGNIFICANT CHANGE UP (ref 3.8–10.5)
WBC # FLD AUTO: 10.13 K/UL — SIGNIFICANT CHANGE UP (ref 3.8–10.5)
WBC # FLD AUTO: 10.27 K/UL — SIGNIFICANT CHANGE UP (ref 3.8–10.5)
WBC # FLD AUTO: 10.33 K/UL — SIGNIFICANT CHANGE UP (ref 3.8–10.5)
WBC # FLD AUTO: 13.47 K/UL — HIGH (ref 3.8–10.5)
WBC # FLD AUTO: 9.18 K/UL — SIGNIFICANT CHANGE UP (ref 3.8–10.5)
WBC # FLD AUTO: 9.35 K/UL — SIGNIFICANT CHANGE UP (ref 3.8–10.5)
WBC # FLD AUTO: 9.79 K/UL — SIGNIFICANT CHANGE UP (ref 3.8–10.5)

## 2020-01-01 PROCEDURE — 81001 URINALYSIS AUTO W/SCOPE: CPT

## 2020-01-01 PROCEDURE — 80048 BASIC METABOLIC PNL TOTAL CA: CPT

## 2020-01-01 PROCEDURE — 80061 LIPID PANEL: CPT

## 2020-01-01 PROCEDURE — 85730 THROMBOPLASTIN TIME PARTIAL: CPT

## 2020-01-01 PROCEDURE — 86901 BLOOD TYPING SEROLOGIC RH(D): CPT

## 2020-01-01 PROCEDURE — 82962 GLUCOSE BLOOD TEST: CPT

## 2020-01-01 PROCEDURE — 94003 VENT MGMT INPAT SUBQ DAY: CPT

## 2020-01-01 PROCEDURE — 82553 CREATINE MB FRACTION: CPT

## 2020-01-01 PROCEDURE — 87631 RESP VIRUS 3-5 TARGETS: CPT

## 2020-01-01 PROCEDURE — 86140 C-REACTIVE PROTEIN: CPT

## 2020-01-01 PROCEDURE — 87040 BLOOD CULTURE FOR BACTERIA: CPT

## 2020-01-01 PROCEDURE — 84484 ASSAY OF TROPONIN QUANT: CPT

## 2020-01-01 PROCEDURE — 87640 STAPH A DNA AMP PROBE: CPT

## 2020-01-01 PROCEDURE — 87449 NOS EACH ORGANISM AG IA: CPT

## 2020-01-01 PROCEDURE — 96374 THER/PROPH/DIAG INJ IV PUSH: CPT

## 2020-01-01 PROCEDURE — 87086 URINE CULTURE/COLONY COUNT: CPT

## 2020-01-01 PROCEDURE — 82550 ASSAY OF CK (CPK): CPT

## 2020-01-01 PROCEDURE — 86850 RBC ANTIBODY SCREEN: CPT

## 2020-01-01 PROCEDURE — 71045 X-RAY EXAM CHEST 1 VIEW: CPT | Mod: 26

## 2020-01-01 PROCEDURE — 71045 X-RAY EXAM CHEST 1 VIEW: CPT | Mod: 26,77

## 2020-01-01 PROCEDURE — 93005 ELECTROCARDIOGRAM TRACING: CPT

## 2020-01-01 PROCEDURE — U0001: CPT

## 2020-01-01 PROCEDURE — 82607 VITAMIN B-12: CPT

## 2020-01-01 PROCEDURE — 87641 MR-STAPH DNA AMP PROBE: CPT

## 2020-01-01 PROCEDURE — 83921 ORGANIC ACID SINGLE QUANT: CPT

## 2020-01-01 PROCEDURE — 94640 AIRWAY INHALATION TREATMENT: CPT

## 2020-01-01 PROCEDURE — 36415 COLL VENOUS BLD VENIPUNCTURE: CPT

## 2020-01-01 PROCEDURE — 99285 EMERGENCY DEPT VISIT HI MDM: CPT

## 2020-01-01 PROCEDURE — 82570 ASSAY OF URINE CREATININE: CPT

## 2020-01-01 PROCEDURE — 85379 FIBRIN DEGRADATION QUANT: CPT

## 2020-01-01 PROCEDURE — 86738 MYCOPLASMA ANTIBODY: CPT

## 2020-01-01 PROCEDURE — 87340 HEPATITIS B SURFACE AG IA: CPT

## 2020-01-01 PROCEDURE — 82803 BLOOD GASES ANY COMBINATION: CPT

## 2020-01-01 PROCEDURE — 85652 RBC SED RATE AUTOMATED: CPT

## 2020-01-01 PROCEDURE — 82728 ASSAY OF FERRITIN: CPT

## 2020-01-01 PROCEDURE — 83605 ASSAY OF LACTIC ACID: CPT

## 2020-01-01 PROCEDURE — 83935 ASSAY OF URINE OSMOLALITY: CPT

## 2020-01-01 PROCEDURE — 83090 ASSAY OF HOMOCYSTEINE: CPT

## 2020-01-01 PROCEDURE — 80053 COMPREHEN METABOLIC PANEL: CPT

## 2020-01-01 PROCEDURE — 83880 ASSAY OF NATRIURETIC PEPTIDE: CPT

## 2020-01-01 PROCEDURE — 86360 T CELL ABSOLUTE COUNT/RATIO: CPT

## 2020-01-01 PROCEDURE — 86900 BLOOD TYPING SEROLOGIC ABO: CPT

## 2020-01-01 PROCEDURE — 99255 IP/OBS CONSLTJ NEW/EST HI 80: CPT

## 2020-01-01 PROCEDURE — 82746 ASSAY OF FOLIC ACID SERUM: CPT

## 2020-01-01 PROCEDURE — 96375 TX/PRO/DX INJ NEW DRUG ADDON: CPT

## 2020-01-01 PROCEDURE — 71045 X-RAY EXAM CHEST 1 VIEW: CPT

## 2020-01-01 PROCEDURE — 83735 ASSAY OF MAGNESIUM: CPT

## 2020-01-01 PROCEDURE — 85610 PROTHROMBIN TIME: CPT

## 2020-01-01 PROCEDURE — 85027 COMPLETE CBC AUTOMATED: CPT

## 2020-01-01 PROCEDURE — 94002 VENT MGMT INPAT INIT DAY: CPT

## 2020-01-01 PROCEDURE — 83036 HEMOGLOBIN GLYCOSYLATED A1C: CPT

## 2020-01-01 PROCEDURE — 84443 ASSAY THYROID STIM HORMONE: CPT

## 2020-01-01 PROCEDURE — 84100 ASSAY OF PHOSPHORUS: CPT

## 2020-01-01 PROCEDURE — 83615 LACTATE (LD) (LDH) ENZYME: CPT

## 2020-01-01 PROCEDURE — 84300 ASSAY OF URINE SODIUM: CPT

## 2020-01-01 PROCEDURE — 99261: CPT

## 2020-01-01 RX ORDER — POLYETHYLENE GLYCOL 3350 17 G/17G
17 POWDER, FOR SOLUTION ORAL DAILY
Refills: 0 | Status: DISCONTINUED | OUTPATIENT
Start: 2020-01-01 | End: 2020-01-01

## 2020-01-01 RX ORDER — HYDROMORPHONE HYDROCHLORIDE 2 MG/ML
1.5 INJECTION INTRAMUSCULAR; INTRAVENOUS; SUBCUTANEOUS
Qty: 100 | Refills: 0 | Status: DISCONTINUED | OUTPATIENT
Start: 2020-01-01 | End: 2020-01-01

## 2020-01-01 RX ORDER — ALBUTEROL 90 UG/1
2 AEROSOL, METERED ORAL EVERY 6 HOURS
Refills: 0 | Status: DISCONTINUED | OUTPATIENT
Start: 2020-01-01 | End: 2020-01-01

## 2020-01-01 RX ORDER — PROPOFOL 10 MG/ML
10 INJECTION, EMULSION INTRAVENOUS
Qty: 1000 | Refills: 0 | Status: DISCONTINUED | OUTPATIENT
Start: 2020-01-01 | End: 2020-01-01

## 2020-01-01 RX ORDER — CHLORHEXIDINE GLUCONATE 213 G/1000ML
1 SOLUTION TOPICAL DAILY
Refills: 0 | Status: DISCONTINUED | OUTPATIENT
Start: 2020-01-01 | End: 2020-01-01

## 2020-01-01 RX ORDER — CEFTRIAXONE 500 MG/1
1000 INJECTION, POWDER, FOR SOLUTION INTRAMUSCULAR; INTRAVENOUS EVERY 24 HOURS
Refills: 0 | Status: DISCONTINUED | OUTPATIENT
Start: 2020-01-01 | End: 2020-01-01

## 2020-01-01 RX ORDER — SODIUM CHLORIDE 9 MG/ML
10 INJECTION INTRAMUSCULAR; INTRAVENOUS; SUBCUTANEOUS
Refills: 0 | Status: DISCONTINUED | OUTPATIENT
Start: 2020-01-01 | End: 2020-01-01

## 2020-01-01 RX ORDER — ACETAMINOPHEN 500 MG
1000 TABLET ORAL ONCE
Refills: 0 | Status: COMPLETED | OUTPATIENT
Start: 2020-01-01 | End: 2020-01-01

## 2020-01-01 RX ORDER — MIDAZOLAM HYDROCHLORIDE 1 MG/ML
0.02 INJECTION, SOLUTION INTRAMUSCULAR; INTRAVENOUS
Qty: 100 | Refills: 0 | Status: DISCONTINUED | OUTPATIENT
Start: 2020-01-01 | End: 2020-01-01

## 2020-01-01 RX ORDER — SODIUM ZIRCONIUM CYCLOSILICATE 10 G/10G
10 POWDER, FOR SUSPENSION ORAL ONCE
Refills: 0 | Status: COMPLETED | OUTPATIENT
Start: 2020-01-01 | End: 2020-01-01

## 2020-01-01 RX ORDER — PHENYLEPHRINE HYDROCHLORIDE 10 MG/ML
0.1 INJECTION INTRAVENOUS
Qty: 40 | Refills: 0 | Status: DISCONTINUED | OUTPATIENT
Start: 2020-01-01 | End: 2020-01-01

## 2020-01-01 RX ORDER — ACETAMINOPHEN 500 MG
650 TABLET ORAL ONCE
Refills: 0 | Status: COMPLETED | OUTPATIENT
Start: 2020-01-01 | End: 2020-01-01

## 2020-01-01 RX ORDER — VASOPRESSIN 20 [USP'U]/ML
0.02 INJECTION INTRAVENOUS
Qty: 50 | Refills: 0 | Status: DISCONTINUED | OUTPATIENT
Start: 2020-01-01 | End: 2020-01-01

## 2020-01-01 RX ORDER — SODIUM CHLORIDE 9 MG/ML
2300 INJECTION INTRAMUSCULAR; INTRAVENOUS; SUBCUTANEOUS ONCE
Refills: 0 | Status: COMPLETED | OUTPATIENT
Start: 2020-01-01 | End: 2020-01-01

## 2020-01-01 RX ORDER — NOREPINEPHRINE BITARTRATE/D5W 8 MG/250ML
0.05 PLASTIC BAG, INJECTION (ML) INTRAVENOUS
Qty: 16 | Refills: 0 | Status: DISCONTINUED | OUTPATIENT
Start: 2020-01-01 | End: 2020-01-01

## 2020-01-01 RX ORDER — ASPIRIN/CALCIUM CARB/MAGNESIUM 324 MG
81 TABLET ORAL DAILY
Refills: 0 | Status: DISCONTINUED | OUTPATIENT
Start: 2020-01-01 | End: 2020-01-01

## 2020-01-01 RX ORDER — HYDROMORPHONE HYDROCHLORIDE 2 MG/ML
0.1 INJECTION INTRAMUSCULAR; INTRAVENOUS; SUBCUTANEOUS
Qty: 100 | Refills: 0 | Status: DISCONTINUED | OUTPATIENT
Start: 2020-01-01 | End: 2020-01-01

## 2020-01-01 RX ORDER — HYDROMORPHONE HYDROCHLORIDE 2 MG/ML
0.5 INJECTION INTRAMUSCULAR; INTRAVENOUS; SUBCUTANEOUS EVERY 4 HOURS
Refills: 0 | Status: DISCONTINUED | OUTPATIENT
Start: 2020-01-01 | End: 2020-01-01

## 2020-01-01 RX ORDER — CHLORHEXIDINE GLUCONATE 213 G/1000ML
1 SOLUTION TOPICAL
Refills: 0 | Status: DISCONTINUED | OUTPATIENT
Start: 2020-01-01 | End: 2020-01-01

## 2020-01-01 RX ORDER — MUPIROCIN 20 MG/G
1 OINTMENT TOPICAL
Refills: 0 | Status: DISCONTINUED | OUTPATIENT
Start: 2020-01-01 | End: 2020-01-01

## 2020-01-01 RX ORDER — INSULIN HUMAN 100 [IU]/ML
5 INJECTION, SOLUTION SUBCUTANEOUS
Qty: 100 | Refills: 0 | Status: DISCONTINUED | OUTPATIENT
Start: 2020-01-01 | End: 2020-01-01

## 2020-01-01 RX ORDER — THIAMINE MONONITRATE (VIT B1) 100 MG
500 TABLET ORAL DAILY
Refills: 0 | Status: DISCONTINUED | OUTPATIENT
Start: 2020-01-01 | End: 2020-01-01

## 2020-01-01 RX ORDER — PANTOPRAZOLE SODIUM 20 MG/1
40 TABLET, DELAYED RELEASE ORAL
Refills: 0 | Status: DISCONTINUED | OUTPATIENT
Start: 2020-01-01 | End: 2020-01-01

## 2020-01-01 RX ORDER — INSULIN HUMAN 100 [IU]/ML
5 INJECTION, SOLUTION SUBCUTANEOUS ONCE
Refills: 0 | Status: COMPLETED | OUTPATIENT
Start: 2020-01-01 | End: 2020-01-01

## 2020-01-01 RX ORDER — SODIUM BICARBONATE 1 MEQ/ML
50 SYRINGE (ML) INTRAVENOUS ONCE
Refills: 0 | Status: DISCONTINUED | OUTPATIENT
Start: 2020-01-01 | End: 2020-01-01

## 2020-01-01 RX ORDER — DEXTROSE 50 % IN WATER 50 %
50 SYRINGE (ML) INTRAVENOUS ONCE
Refills: 0 | Status: COMPLETED | OUTPATIENT
Start: 2020-01-01 | End: 2020-01-01

## 2020-01-01 RX ORDER — HYDROXYCHLOROQUINE SULFATE 200 MG
200 TABLET ORAL
Refills: 0 | Status: COMPLETED | OUTPATIENT
Start: 2020-01-01 | End: 2020-01-01

## 2020-01-01 RX ORDER — DEXTROSE 10 % IN WATER 10 %
1000 INTRAVENOUS SOLUTION INTRAVENOUS
Refills: 0 | Status: DISCONTINUED | OUTPATIENT
Start: 2020-01-01 | End: 2020-01-01

## 2020-01-01 RX ORDER — ACETAMINOPHEN 500 MG
650 TABLET ORAL EVERY 6 HOURS
Refills: 0 | Status: DISCONTINUED | OUTPATIENT
Start: 2020-01-01 | End: 2020-01-01

## 2020-01-01 RX ORDER — SODIUM CHLORIDE 9 MG/ML
500 INJECTION INTRAMUSCULAR; INTRAVENOUS; SUBCUTANEOUS ONCE
Refills: 0 | Status: COMPLETED | OUTPATIENT
Start: 2020-01-01 | End: 2020-01-01

## 2020-01-01 RX ORDER — SODIUM BICARBONATE 1 MEQ/ML
50 SYRINGE (ML) INTRAVENOUS ONCE
Refills: 0 | Status: COMPLETED | OUTPATIENT
Start: 2020-01-01 | End: 2020-01-01

## 2020-01-01 RX ORDER — CHLORHEXIDINE GLUCONATE 213 G/1000ML
15 SOLUTION TOPICAL EVERY 12 HOURS
Refills: 0 | Status: DISCONTINUED | OUTPATIENT
Start: 2020-01-01 | End: 2020-01-01

## 2020-01-01 RX ORDER — CEFTRIAXONE 500 MG/1
1000 INJECTION, POWDER, FOR SOLUTION INTRAMUSCULAR; INTRAVENOUS ONCE
Refills: 0 | Status: COMPLETED | OUTPATIENT
Start: 2020-01-01 | End: 2020-01-01

## 2020-01-01 RX ORDER — SODIUM BICARBONATE 1 MEQ/ML
325 SYRINGE (ML) INTRAVENOUS THREE TIMES A DAY
Refills: 0 | Status: DISCONTINUED | OUTPATIENT
Start: 2020-01-01 | End: 2020-01-01

## 2020-01-01 RX ORDER — AZITHROMYCIN 500 MG/1
500 TABLET, FILM COATED ORAL ONCE
Refills: 0 | Status: COMPLETED | OUTPATIENT
Start: 2020-01-01 | End: 2020-01-01

## 2020-01-01 RX ORDER — ENOXAPARIN SODIUM 100 MG/ML
40 INJECTION SUBCUTANEOUS DAILY
Refills: 0 | Status: DISCONTINUED | OUTPATIENT
Start: 2020-01-01 | End: 2020-01-01

## 2020-01-01 RX ORDER — SODIUM ZIRCONIUM CYCLOSILICATE 10 G/10G
10 POWDER, FOR SUSPENSION ORAL THREE TIMES A DAY
Refills: 0 | Status: DISCONTINUED | OUTPATIENT
Start: 2020-01-01 | End: 2020-01-01

## 2020-01-01 RX ORDER — PROPOFOL 10 MG/ML
20 INJECTION, EMULSION INTRAVENOUS
Qty: 1000 | Refills: 0 | Status: DISCONTINUED | OUTPATIENT
Start: 2020-01-01 | End: 2020-01-01

## 2020-01-01 RX ORDER — HYDROXYCHLOROQUINE SULFATE 200 MG
400 TABLET ORAL EVERY 12 HOURS
Refills: 0 | Status: COMPLETED | OUTPATIENT
Start: 2020-01-01 | End: 2020-01-01

## 2020-01-01 RX ORDER — SODIUM BICARBONATE 1 MEQ/ML
0.1 SYRINGE (ML) INTRAVENOUS
Qty: 150 | Refills: 0 | Status: DISCONTINUED | OUTPATIENT
Start: 2020-01-01 | End: 2020-01-01

## 2020-01-01 RX ORDER — SODIUM CHLORIDE 9 MG/ML
1000 INJECTION INTRAMUSCULAR; INTRAVENOUS; SUBCUTANEOUS
Refills: 0 | Status: DISCONTINUED | OUTPATIENT
Start: 2020-01-01 | End: 2020-01-01

## 2020-01-01 RX ORDER — METOPROLOL TARTRATE 50 MG
5 TABLET ORAL ONCE
Refills: 0 | Status: COMPLETED | OUTPATIENT
Start: 2020-01-01 | End: 2020-01-01

## 2020-01-01 RX ORDER — ALBUTEROL 90 UG/1
3 AEROSOL, METERED ORAL ONCE
Refills: 0 | Status: COMPLETED | OUTPATIENT
Start: 2020-01-01 | End: 2020-01-01

## 2020-01-01 RX ORDER — SODIUM CHLORIDE 9 MG/ML
1000 INJECTION INTRAMUSCULAR; INTRAVENOUS; SUBCUTANEOUS ONCE
Refills: 0 | Status: COMPLETED | OUTPATIENT
Start: 2020-01-01 | End: 2020-01-01

## 2020-01-01 RX ORDER — HYDROXYCHLOROQUINE SULFATE 200 MG
400 TABLET ORAL EVERY 12 HOURS
Refills: 0 | Status: DISCONTINUED | OUTPATIENT
Start: 2020-01-01 | End: 2020-01-01

## 2020-01-01 RX ORDER — SODIUM BICARBONATE 1 MEQ/ML
50 SYRINGE (ML) INTRAVENOUS
Refills: 0 | Status: DISCONTINUED | OUTPATIENT
Start: 2020-01-01 | End: 2020-01-01

## 2020-01-01 RX ORDER — VASOPRESSIN 20 [USP'U]/ML
0.04 INJECTION INTRAVENOUS
Qty: 50 | Refills: 0 | Status: DISCONTINUED | OUTPATIENT
Start: 2020-01-01 | End: 2020-01-01

## 2020-01-01 RX ORDER — MIDAZOLAM HYDROCHLORIDE 1 MG/ML
4 INJECTION, SOLUTION INTRAMUSCULAR; INTRAVENOUS ONCE
Refills: 0 | Status: DISCONTINUED | OUTPATIENT
Start: 2020-01-01 | End: 2020-01-01

## 2020-01-01 RX ORDER — SODIUM POLYSTYRENE SULFONATE 4.1 MEQ/G
15 POWDER, FOR SUSPENSION ORAL ONCE
Refills: 0 | Status: DISCONTINUED | OUTPATIENT
Start: 2020-01-01 | End: 2020-01-01

## 2020-01-01 RX ORDER — MIDAZOLAM HYDROCHLORIDE 1 MG/ML
0.4 INJECTION, SOLUTION INTRAMUSCULAR; INTRAVENOUS
Qty: 100 | Refills: 0 | Status: DISCONTINUED | OUTPATIENT
Start: 2020-01-01 | End: 2020-01-01

## 2020-01-01 RX ORDER — HEPARIN SODIUM 5000 [USP'U]/ML
5000 INJECTION INTRAVENOUS; SUBCUTANEOUS EVERY 8 HOURS
Refills: 0 | Status: DISCONTINUED | OUTPATIENT
Start: 2020-01-01 | End: 2020-01-01

## 2020-01-01 RX ORDER — PANTOPRAZOLE SODIUM 20 MG/1
40 TABLET, DELAYED RELEASE ORAL DAILY
Refills: 0 | Status: DISCONTINUED | OUTPATIENT
Start: 2020-01-01 | End: 2020-01-01

## 2020-01-01 RX ORDER — AZITHROMYCIN 500 MG/1
500 TABLET, FILM COATED ORAL EVERY 24 HOURS
Refills: 0 | Status: DISCONTINUED | OUTPATIENT
Start: 2020-01-01 | End: 2020-01-01

## 2020-01-01 RX ADMIN — CEFTRIAXONE 100 MILLIGRAM(S): 500 INJECTION, POWDER, FOR SOLUTION INTRAMUSCULAR; INTRAVENOUS at 03:09

## 2020-01-01 RX ADMIN — HYDROMORPHONE HYDROCHLORIDE 1.5 MG/HR: 2 INJECTION INTRAMUSCULAR; INTRAVENOUS; SUBCUTANEOUS at 12:20

## 2020-01-01 RX ADMIN — PROPOFOL 4.36 MICROGRAM(S)/KG/MIN: 10 INJECTION, EMULSION INTRAVENOUS at 00:10

## 2020-01-01 RX ADMIN — Medication 81 MILLIGRAM(S): at 11:50

## 2020-01-01 RX ADMIN — Medication 400 MILLIGRAM(S): at 17:18

## 2020-01-01 RX ADMIN — Medication 650 MILLIGRAM(S): at 18:50

## 2020-01-01 RX ADMIN — Medication 650 MILLIGRAM(S): at 09:30

## 2020-01-01 RX ADMIN — Medication 105 MILLIGRAM(S): at 11:50

## 2020-01-01 RX ADMIN — CEFTRIAXONE 100 MILLIGRAM(S): 500 INJECTION, POWDER, FOR SOLUTION INTRAMUSCULAR; INTRAVENOUS at 02:47

## 2020-01-01 RX ADMIN — ENOXAPARIN SODIUM 40 MILLIGRAM(S): 100 INJECTION SUBCUTANEOUS at 12:16

## 2020-01-01 RX ADMIN — PROPOFOL 4.36 MICROGRAM(S)/KG/MIN: 10 INJECTION, EMULSION INTRAVENOUS at 15:03

## 2020-01-01 RX ADMIN — Medication 105 MILLIGRAM(S): at 12:15

## 2020-01-01 RX ADMIN — HYDROMORPHONE HYDROCHLORIDE 1.5 MG/HR: 2 INJECTION INTRAMUSCULAR; INTRAVENOUS; SUBCUTANEOUS at 11:39

## 2020-01-01 RX ADMIN — HYDROMORPHONE HYDROCHLORIDE 0.1 MG/HR: 2 INJECTION INTRAMUSCULAR; INTRAVENOUS; SUBCUTANEOUS at 17:32

## 2020-01-01 RX ADMIN — HEPARIN SODIUM 5000 UNIT(S): 5000 INJECTION INTRAVENOUS; SUBCUTANEOUS at 23:14

## 2020-01-01 RX ADMIN — Medication 50 MILLIEQUIVALENT(S): at 07:31

## 2020-01-01 RX ADMIN — Medication 400 MILLIGRAM(S): at 22:59

## 2020-01-01 RX ADMIN — Medication 325 MILLIGRAM(S): at 05:13

## 2020-01-01 RX ADMIN — Medication 50 MILLILITER(S): at 10:17

## 2020-01-01 RX ADMIN — PROPOFOL 4.36 MICROGRAM(S)/KG/MIN: 10 INJECTION, EMULSION INTRAVENOUS at 22:31

## 2020-01-01 RX ADMIN — Medication 325 MILLIGRAM(S): at 22:37

## 2020-01-01 RX ADMIN — PANTOPRAZOLE SODIUM 40 MILLIGRAM(S): 20 TABLET, DELAYED RELEASE ORAL at 05:14

## 2020-01-01 RX ADMIN — Medication 105 MILLIGRAM(S): at 12:16

## 2020-01-01 RX ADMIN — Medication 3.4 MICROGRAM(S)/KG/MIN: at 20:27

## 2020-01-01 RX ADMIN — Medication 1000 MILLIGRAM(S): at 23:07

## 2020-01-01 RX ADMIN — Medication 200 MILLIGRAM(S): at 18:55

## 2020-01-01 RX ADMIN — AZITHROMYCIN 255 MILLIGRAM(S): 500 TABLET, FILM COATED ORAL at 17:19

## 2020-01-01 RX ADMIN — CHLORHEXIDINE GLUCONATE 1 APPLICATION(S): 213 SOLUTION TOPICAL at 11:58

## 2020-01-01 RX ADMIN — HEPARIN SODIUM 5000 UNIT(S): 5000 INJECTION INTRAVENOUS; SUBCUTANEOUS at 05:11

## 2020-01-01 RX ADMIN — HEPARIN SODIUM 5000 UNIT(S): 5000 INJECTION INTRAVENOUS; SUBCUTANEOUS at 04:50

## 2020-01-01 RX ADMIN — AZITHROMYCIN 255 MILLIGRAM(S): 500 TABLET, FILM COATED ORAL at 02:10

## 2020-01-01 RX ADMIN — PROPOFOL 4.36 MICROGRAM(S)/KG/MIN: 10 INJECTION, EMULSION INTRAVENOUS at 19:21

## 2020-01-01 RX ADMIN — HEPARIN SODIUM 5000 UNIT(S): 5000 INJECTION INTRAVENOUS; SUBCUTANEOUS at 15:13

## 2020-01-01 RX ADMIN — Medication 100 MILLIGRAM(S): at 02:15

## 2020-01-01 RX ADMIN — CHLORHEXIDINE GLUCONATE 15 MILLILITER(S): 213 SOLUTION TOPICAL at 04:49

## 2020-01-01 RX ADMIN — HEPARIN SODIUM 5000 UNIT(S): 5000 INJECTION INTRAVENOUS; SUBCUTANEOUS at 22:53

## 2020-01-01 RX ADMIN — AZITHROMYCIN 255 MILLIGRAM(S): 500 TABLET, FILM COATED ORAL at 02:31

## 2020-01-01 RX ADMIN — Medication 81 MILLIGRAM(S): at 12:15

## 2020-01-01 RX ADMIN — Medication 3.4 MICROGRAM(S)/KG/MIN: at 01:37

## 2020-01-01 RX ADMIN — MUPIROCIN 1 APPLICATION(S): 20 OINTMENT TOPICAL at 04:49

## 2020-01-01 RX ADMIN — CHLORHEXIDINE GLUCONATE 1 APPLICATION(S): 213 SOLUTION TOPICAL at 12:16

## 2020-01-01 RX ADMIN — PANTOPRAZOLE SODIUM 40 MILLIGRAM(S): 20 TABLET, DELAYED RELEASE ORAL at 05:15

## 2020-01-01 RX ADMIN — SODIUM CHLORIDE 500 MILLILITER(S): 9 INJECTION INTRAMUSCULAR; INTRAVENOUS; SUBCUTANEOUS at 10:33

## 2020-01-01 RX ADMIN — Medication 200 MILLIGRAM(S): at 04:50

## 2020-01-01 RX ADMIN — Medication 3.4 MICROGRAM(S)/KG/MIN: at 05:14

## 2020-01-01 RX ADMIN — CHLORHEXIDINE GLUCONATE 15 MILLILITER(S): 213 SOLUTION TOPICAL at 18:37

## 2020-01-01 RX ADMIN — MUPIROCIN 1 APPLICATION(S): 20 OINTMENT TOPICAL at 17:38

## 2020-01-01 RX ADMIN — PROPOFOL 4.36 MICROGRAM(S)/KG/MIN: 10 INJECTION, EMULSION INTRAVENOUS at 20:27

## 2020-01-01 RX ADMIN — MIDAZOLAM HYDROCHLORIDE 1.45 MG/KG/HR: 1 INJECTION, SOLUTION INTRAMUSCULAR; INTRAVENOUS at 22:25

## 2020-01-01 RX ADMIN — Medication 400 MILLIGRAM(S): at 17:38

## 2020-01-01 RX ADMIN — Medication 3.4 MICROGRAM(S)/KG/MIN: at 22:24

## 2020-01-01 RX ADMIN — Medication 3.4 MICROGRAM(S)/KG/MIN: at 16:39

## 2020-01-01 RX ADMIN — MIDAZOLAM HYDROCHLORIDE 1.45 MG/KG/HR: 1 INJECTION, SOLUTION INTRAMUSCULAR; INTRAVENOUS at 11:15

## 2020-01-01 RX ADMIN — PROPOFOL 4.36 MICROGRAM(S)/KG/MIN: 10 INJECTION, EMULSION INTRAVENOUS at 01:37

## 2020-01-01 RX ADMIN — PROPOFOL 4.36 MICROGRAM(S)/KG/MIN: 10 INJECTION, EMULSION INTRAVENOUS at 10:13

## 2020-01-01 RX ADMIN — PROPOFOL 4.36 MICROGRAM(S)/KG/MIN: 10 INJECTION, EMULSION INTRAVENOUS at 23:15

## 2020-01-01 RX ADMIN — Medication 81 MILLIGRAM(S): at 12:21

## 2020-01-01 RX ADMIN — SODIUM CHLORIDE 2300 MILLILITER(S): 9 INJECTION INTRAMUSCULAR; INTRAVENOUS; SUBCUTANEOUS at 17:18

## 2020-01-01 RX ADMIN — Medication 200 MILLIGRAM(S): at 05:11

## 2020-01-01 RX ADMIN — Medication 81 MILLIGRAM(S): at 20:27

## 2020-01-01 RX ADMIN — HEPARIN SODIUM 5000 UNIT(S): 5000 INJECTION INTRAVENOUS; SUBCUTANEOUS at 05:14

## 2020-01-01 RX ADMIN — Medication 1000 MILLIGRAM(S): at 17:30

## 2020-01-01 RX ADMIN — CHLORHEXIDINE GLUCONATE 15 MILLILITER(S): 213 SOLUTION TOPICAL at 18:00

## 2020-01-01 RX ADMIN — SODIUM ZIRCONIUM CYCLOSILICATE 10 GRAM(S): 10 POWDER, FOR SUSPENSION ORAL at 10:17

## 2020-01-01 RX ADMIN — SODIUM ZIRCONIUM CYCLOSILICATE 10 GRAM(S): 10 POWDER, FOR SUSPENSION ORAL at 15:09

## 2020-01-01 RX ADMIN — CHLORHEXIDINE GLUCONATE 15 MILLILITER(S): 213 SOLUTION TOPICAL at 17:36

## 2020-01-01 RX ADMIN — HEPARIN SODIUM 5000 UNIT(S): 5000 INJECTION INTRAVENOUS; SUBCUTANEOUS at 21:37

## 2020-01-01 RX ADMIN — Medication 650 MILLIGRAM(S): at 18:55

## 2020-01-01 RX ADMIN — Medication 3.4 MICROGRAM(S)/KG/MIN: at 09:10

## 2020-01-01 RX ADMIN — Medication 3.4 MICROGRAM(S)/KG/MIN: at 16:11

## 2020-01-01 RX ADMIN — HEPARIN SODIUM 5000 UNIT(S): 5000 INJECTION INTRAVENOUS; SUBCUTANEOUS at 15:08

## 2020-01-01 RX ADMIN — INSULIN HUMAN 5 UNIT(S): 100 INJECTION, SOLUTION SUBCUTANEOUS at 10:16

## 2020-01-01 RX ADMIN — SODIUM ZIRCONIUM CYCLOSILICATE 10 GRAM(S): 10 POWDER, FOR SUSPENSION ORAL at 05:01

## 2020-01-01 RX ADMIN — SODIUM ZIRCONIUM CYCLOSILICATE 10 GRAM(S): 10 POWDER, FOR SUSPENSION ORAL at 18:00

## 2020-01-01 RX ADMIN — Medication 3.4 MICROGRAM(S)/KG/MIN: at 22:31

## 2020-01-01 RX ADMIN — PROPOFOL 4.36 MICROGRAM(S)/KG/MIN: 10 INJECTION, EMULSION INTRAVENOUS at 09:00

## 2020-01-01 RX ADMIN — MUPIROCIN 1 APPLICATION(S): 20 OINTMENT TOPICAL at 05:14

## 2020-01-01 RX ADMIN — SODIUM CHLORIDE 75 MILLILITER(S): 9 INJECTION INTRAMUSCULAR; INTRAVENOUS; SUBCUTANEOUS at 02:15

## 2020-01-01 RX ADMIN — HYDROMORPHONE HYDROCHLORIDE 0.1 MG/HR: 2 INJECTION INTRAMUSCULAR; INTRAVENOUS; SUBCUTANEOUS at 17:05

## 2020-01-01 RX ADMIN — SODIUM CHLORIDE 1000 MILLILITER(S): 9 INJECTION INTRAMUSCULAR; INTRAVENOUS; SUBCUTANEOUS at 09:20

## 2020-01-01 RX ADMIN — CHLORHEXIDINE GLUCONATE 1 APPLICATION(S): 213 SOLUTION TOPICAL at 18:00

## 2020-01-01 RX ADMIN — CHLORHEXIDINE GLUCONATE 15 MILLILITER(S): 213 SOLUTION TOPICAL at 05:13

## 2020-01-01 RX ADMIN — Medication 3.4 MICROGRAM(S)/KG/MIN: at 12:21

## 2020-01-01 RX ADMIN — CHLORHEXIDINE GLUCONATE 15 MILLILITER(S): 213 SOLUTION TOPICAL at 05:10

## 2020-01-01 RX ADMIN — Medication 200 MILLIGRAM(S): at 18:37

## 2020-01-01 RX ADMIN — CEFTRIAXONE 100 MILLIGRAM(S): 500 INJECTION, POWDER, FOR SOLUTION INTRAMUSCULAR; INTRAVENOUS at 02:31

## 2020-01-01 RX ADMIN — ENOXAPARIN SODIUM 40 MILLIGRAM(S): 100 INJECTION SUBCUTANEOUS at 12:59

## 2020-01-01 RX ADMIN — Medication 400 MILLIGRAM(S): at 05:10

## 2020-01-01 RX ADMIN — PROPOFOL 4.36 MICROGRAM(S)/KG/MIN: 10 INJECTION, EMULSION INTRAVENOUS at 08:21

## 2020-01-01 RX ADMIN — HYDROMORPHONE HYDROCHLORIDE 1.5 MG/HR: 2 INJECTION INTRAMUSCULAR; INTRAVENOUS; SUBCUTANEOUS at 12:18

## 2020-01-01 RX ADMIN — Medication 325 MILLIGRAM(S): at 15:30

## 2020-01-01 RX ADMIN — Medication 81 MILLIGRAM(S): at 12:16

## 2020-01-01 RX ADMIN — CHLORHEXIDINE GLUCONATE 15 MILLILITER(S): 213 SOLUTION TOPICAL at 05:11

## 2020-01-01 RX ADMIN — Medication 3.4 MICROGRAM(S)/KG/MIN: at 11:01

## 2020-01-01 RX ADMIN — PROPOFOL 4.36 MICROGRAM(S)/KG/MIN: 10 INJECTION, EMULSION INTRAVENOUS at 10:33

## 2020-01-01 RX ADMIN — AZITHROMYCIN 255 MILLIGRAM(S): 500 TABLET, FILM COATED ORAL at 03:10

## 2020-01-01 RX ADMIN — PROPOFOL 4.36 MICROGRAM(S)/KG/MIN: 10 INJECTION, EMULSION INTRAVENOUS at 02:49

## 2020-01-01 RX ADMIN — ENOXAPARIN SODIUM 40 MILLIGRAM(S): 100 INJECTION SUBCUTANEOUS at 11:51

## 2020-01-01 RX ADMIN — CEFTRIAXONE 100 MILLIGRAM(S): 500 INJECTION, POWDER, FOR SOLUTION INTRAMUSCULAR; INTRAVENOUS at 17:18

## 2020-01-01 RX ADMIN — CHLORHEXIDINE GLUCONATE 1 APPLICATION(S): 213 SOLUTION TOPICAL at 11:50

## 2020-01-01 RX ADMIN — Medication 200 MILLIGRAM(S): at 05:14

## 2020-01-01 RX ADMIN — AZITHROMYCIN 255 MILLIGRAM(S): 500 TABLET, FILM COATED ORAL at 03:55

## 2020-01-01 RX ADMIN — ALBUTEROL 3 PUFF(S): 90 AEROSOL, METERED ORAL at 12:28

## 2020-01-01 RX ADMIN — PANTOPRAZOLE SODIUM 40 MILLIGRAM(S): 20 TABLET, DELAYED RELEASE ORAL at 05:10

## 2020-01-01 RX ADMIN — CHLORHEXIDINE GLUCONATE 15 MILLILITER(S): 213 SOLUTION TOPICAL at 18:55

## 2020-01-01 RX ADMIN — MUPIROCIN 1 APPLICATION(S): 20 OINTMENT TOPICAL at 18:00

## 2020-01-01 RX ADMIN — Medication 650 MILLIGRAM(S): at 09:00

## 2020-01-01 RX ADMIN — MIDAZOLAM HYDROCHLORIDE 4 MILLIGRAM(S): 1 INJECTION, SOLUTION INTRAMUSCULAR; INTRAVENOUS at 09:00

## 2020-01-01 RX ADMIN — PROPOFOL 4.36 MICROGRAM(S)/KG/MIN: 10 INJECTION, EMULSION INTRAVENOUS at 17:42

## 2020-01-01 RX ADMIN — Medication 325 MILLIGRAM(S): at 15:05

## 2020-01-01 RX ADMIN — PROPOFOL 4.36 MICROGRAM(S)/KG/MIN: 10 INJECTION, EMULSION INTRAVENOUS at 16:39

## 2020-01-01 RX ADMIN — HEPARIN SODIUM 5000 UNIT(S): 5000 INJECTION INTRAVENOUS; SUBCUTANEOUS at 15:25

## 2020-01-01 RX ADMIN — PROPOFOL 4.36 MICROGRAM(S)/KG/MIN: 10 INJECTION, EMULSION INTRAVENOUS at 14:31

## 2020-01-01 RX ADMIN — INSULIN HUMAN 5 UNIT(S): 100 INJECTION, SOLUTION SUBCUTANEOUS at 05:00

## 2020-01-01 RX ADMIN — MUPIROCIN 1 APPLICATION(S): 20 OINTMENT TOPICAL at 18:37

## 2020-01-01 RX ADMIN — Medication 3.4 MICROGRAM(S)/KG/MIN: at 06:55

## 2020-01-01 RX ADMIN — PROPOFOL 4.36 MICROGRAM(S)/KG/MIN: 10 INJECTION, EMULSION INTRAVENOUS at 03:57

## 2020-01-01 RX ADMIN — CEFTRIAXONE 100 MILLIGRAM(S): 500 INJECTION, POWDER, FOR SOLUTION INTRAMUSCULAR; INTRAVENOUS at 02:11

## 2020-01-01 RX ADMIN — Medication 200 MILLIGRAM(S): at 17:40

## 2020-01-01 RX ADMIN — Medication 50 MILLILITER(S): at 04:51

## 2020-01-01 RX ADMIN — CHLORHEXIDINE GLUCONATE 15 MILLILITER(S): 213 SOLUTION TOPICAL at 17:37

## 2020-01-01 RX ADMIN — PHENYLEPHRINE HYDROCHLORIDE 2.72 MICROGRAM(S)/KG/MIN: 10 INJECTION INTRAVENOUS at 08:50

## 2020-01-01 RX ADMIN — SODIUM ZIRCONIUM CYCLOSILICATE 10 GRAM(S): 10 POWDER, FOR SUSPENSION ORAL at 10:30

## 2020-01-01 RX ADMIN — CEFTRIAXONE 100 MILLIGRAM(S): 500 INJECTION, POWDER, FOR SOLUTION INTRAMUSCULAR; INTRAVENOUS at 04:49

## 2020-01-01 RX ADMIN — PROPOFOL 4.36 MICROGRAM(S)/KG/MIN: 10 INJECTION, EMULSION INTRAVENOUS at 05:18

## 2020-01-01 RX ADMIN — AZITHROMYCIN 255 MILLIGRAM(S): 500 TABLET, FILM COATED ORAL at 02:47

## 2020-01-01 RX ADMIN — CEFTRIAXONE 100 MILLIGRAM(S): 500 INJECTION, POWDER, FOR SOLUTION INTRAMUSCULAR; INTRAVENOUS at 03:56

## 2020-01-01 RX ADMIN — Medication 105 MILLIGRAM(S): at 13:30

## 2020-01-01 RX ADMIN — Medication 200 MILLIGRAM(S): at 18:00

## 2020-01-01 RX ADMIN — PANTOPRAZOLE SODIUM 40 MILLIGRAM(S): 20 TABLET, DELAYED RELEASE ORAL at 12:15

## 2020-01-01 RX ADMIN — MUPIROCIN 1 APPLICATION(S): 20 OINTMENT TOPICAL at 05:11

## 2020-01-01 RX ADMIN — PROPOFOL 4.36 MICROGRAM(S)/KG/MIN: 10 INJECTION, EMULSION INTRAVENOUS at 20:58

## 2020-03-21 NOTE — ED PROVIDER NOTE - CLINICAL SUMMARY MEDICAL DECISION MAKING FREE TEXT BOX
63 y/o M presents with fever and cough, will get sepsis labs. Patient hypoxic, will place on nasal cannula. Patient otherwise appears comfortable.

## 2020-03-21 NOTE — H&P ADULT - PROBLEM SELECTOR PLAN 1
- p/w with altered mental status , worsening SOB wheezing with dry cough  - PE : B/L expiatory wheezing with rhonchi    - CXR : left perihilar infiltrate/Ct abd/CXR --   - no Leukocytosis  - lymphocyte; WNl   - pt is afebrile  - O2 Sat on Room air 92%   - s/p 1 dose of zosyn / Rocephin and Azithro in ED.  - will start on Rocephin and Azithro to cover for CAp/ Zosyn q12 (renally dosed)   - Also Will rule out covid 19; testing : contact and airborne isolation precaution , Aspiration Precautions .  - Avoid Levaquin and Zithromax (prolonged )  - c/w Albuterol Inhaler  - Supportive care , AntPyeretic Tylenol PRN  and anti-tussives Robitussin PRN , Supplemental O2 via nasal cannula.  - flu A -ve   - f/u Bl Cx   - f/u CXR AM  - f/u Procalcitonin  , Legionella Ag , strept , mycoplasma antigen    - f/u Coags , D-Dimers , ESR , CRP , LDH, ferritin , Lactate , T Cell Subset , Cardiac enzymes , G6PD   ** (please get CRP daily and get ESR ,T Cell Subset , D-Dimers , LDH, ferritin , Cardiac enzymes , Coags every 3 days) - p/w with worsening SOB wheezing with cough with white plegm   - PE : mild B/L expiatory wheezing   - CXR : Showing B/L Lower lobes infiltrate and opacities (f/u official report)   - no Leukocytosis  - lymphocyte; WNl   - flu A -ve   - pt is afebrile  - O2 Sat on Room air 97%   - s/p 1 dose of Rocephin and Azithro in ED.  - will hold off Abs for now   - Also Will rule out covid 19; testing : contact and airborne isolation precaution   - c/w Albuterol Inhaler  - Supportive care , AntPyeretic Tylenol PRN  and anti-tussives Robitussin PRN , Supplemental O2 via nasal cannul if needed  - f/u Bl Cx   - f/u CXR AM  - f/u Procalcitonin  , Legionella Ag , strept , mycoplasma antigen    - f/u D-Dimers , ESR , CRP , LDH, ferritin , Lactate - p/w with worsening SOB wheezing with cough with white plegm   - PE : mild B/L expiatory wheezing   - CXR : Showing B/L Lower lobes infiltrate and opacities (f/u official report)   - no Leukocytosis  - lymphocyte; WNl   - flu A -ve   - pt is afebrile  - O2 Sat on Room air 97%   - s/p 1 dose of Rocephin and Azithro in ED.  - will start Rocephin and Zithromax  - Also Will rule out covid 19; testing : contact and airborne isolation precaution   - c/w Albuterol Inhaler  - Supportive care , AntPyeretic Tylenol PRN  and anti-tussives Robitussin PRN , Supplemental O2 via nasal cannul if needed  - f/u Bl Cx   - f/u CXR AM  - f/u Procalcitonin  , Legionella Ag , strept , mycoplasma antigen    - f/u D-Dimers , ESR , CRP , LDH, ferritin , Lactate

## 2020-03-21 NOTE — RAPID RESPONSE TEAM SUMMARY - NSSITUATIONBACKGROUNDRRT_GEN_ALL_CORE
Mr. Oleary is a 64 year old male from home lives with wife ambulates independently with no significant PMHx and has PSHx of neck surgery presents to the ED with complaints of fever x 1 week a/w cough productive of white phlegm and CXR showing B/L lower lobes infiltrate and opacities - admitted for concern of COVID, on arrival to 4N noted w/ abnormal vitals along w/ severe rigors.

## 2020-03-21 NOTE — H&P ADULT - ASSESSMENT
65 y/o M with no significant PMHx/PSHx presents to the ED with complaints of fever x 1 week. Patient states fevers come and go and are associated with cough, productive of phlegm. Patient seen at an urgent care recently and started on doxycycline, Tylenol, cough medications, and albuterol. Patient however reports symptoms have not improved with these medications. Denies chest pain, shortness of breath, abdominal pain, nausea, vomiting, sick contacts, recent travel or any other acute complaints.    In ED :   s/p 2.3 L NS bolus   RR 36   Flu -ve   UA -ve   Lactate 1.3   Pt is FULL CODE.     Pt is admitted for management of CAP 64 year old male from home ambulates independently with no significant PMHx/PSHx presents to the ED with complaints of fever x 1 week. Patient states fevers come and go and are associated with cough, productive of white phlegm. Patient seen at an urgent care recently and started on doxycycline, Tylenol, cough medications, and albuterol. Patient however reports symptoms have not improved with these medications. Denies chest pain, shortness of breath, abdominal pain, nausea, vomiting, sick contacts, recent travel or any other acute complaints.    In ED :   s/p 2.3 L NS bolus   RR 36   Flu -ve   UA -ve   Lactate 1.3   Pt is FULL CODE.     Pt is admitted for management of CAP 64 year old male from home lives with wife  ambulates independently with no significant PMHx and has PSHx of neck surgery  presents to the ED with complaints of fever x 1 week. Patient states fevers come and go and are associated with cough, productive of white phlegm. Patient seen at an urgent care recently and started on doxycycline, Tylenol, cough medications, and albuterol. Patient however reports symptoms have not improved with these medications. Denies chest pain, shortness of breath, abdominal pain, nausea, vomiting, sick contacts, recent travel or any other acute complaints. Denies smoking, alcohol, illicit drug use. NKDA     In ED :   s/p 2.3 L NS bolus   RR 36   Flu -ve   UA -ve   Lactate 1.3   ABG : WNL   CXR : Showing B/L Lower lobes infiltrate and opacities    Pt is FULL CODE.     Pt is admitted for management of CAP and r/o COVID

## 2020-03-21 NOTE — ED ADULT NURSE NOTE - NSIMPLEMENTINTERV_GEN_ALL_ED
Implemented All Universal Safety Interventions:  [place] to call system. Call bell, personal items and telephone within reach. Instruct patient to call for assistance. Room bathroom lighting operational. Non-slip footwear when patient is off stretcher. Physically safe environment: no spills, clutter or unnecessary equipment. Stretcher in lowest position, wheels locked, appropriate side rails in place.

## 2020-03-21 NOTE — ED PROVIDER NOTE - OBJECTIVE STATEMENT
65 y/o M with no significant PMHx/PSHx presents to the ED with complaints of fever x 1 week. Patient states fevers come and go and are associated with cough, productive of phlegm. Patient seen at an urgent care recently and started on doxycycline, Tylenol, cough medications, and albuterol. Patient however reports symptoms have not improved with these medications. Denies chest pain, shortness of breath, abdominal pain, nausea, vomiting, sick contacts, recent travel or any other acute complaints.

## 2020-03-21 NOTE — H&P ADULT - HISTORY OF PRESENT ILLNESS
64 year old male from home lives with wife  ambulates independently with no significant PMHx and has PSHx of neck surgery  presents to the ED with complaints of fever x 1 week. Patient states fevers come and go and are associated with cough, productive of white phlegm. Patient seen at an urgent care recently and started on doxycycline, Tylenol, cough medications, and albuterol. Patient however reports symptoms have not improved with these medications. Denies chest pain, shortness of breath, abdominal pain, nausea, vomiting, sick contacts, recent travel or any other acute complaints. Denies smoking, alcohol, illicit drug use. NKDA     In ED :   s/p 2.3 L NS bolus   RR 36   Flu -ve   UA -ve   Lactate 1.3   ABG : WNL   CXR : Showing B/L Lower lobes infiltrate and opacities    Pt is FULL CODE.

## 2020-03-21 NOTE — ED ADULT NURSE REASSESSMENT NOTE - NS ED NURSE REASSESS COMMENT FT1
Received pt from BENITO Choe, pt is observed laying in bed, breathing room air, in no respiratory distress at time of assessment. Pt is A&O x3, able to make needs known, denies any distress/discomfort at this time. Pt ambulates independently, skin intact, right AC #20Ga in place. No meds administered at this time.   Admitted to [facility], report given to BENITO carlisle [address] [place] [address], Dr. Brantley and the covering NP paged to put in diet and MRSA orders. Awaiting transport to floor, nursing monitoring continues. No family at bedside at this time.

## 2020-03-21 NOTE — H&P ADULT - NSHPPHYSICALEXAM_GEN_ALL_CORE
Vital Signs Last 24 Hrs  T(C): 36.4 (21 Mar 2020 20:38), Max: 37.9 (21 Mar 2020 14:43)  T(F): 97.5 (21 Mar 2020 20:38), Max: 100.2 (21 Mar 2020 14:43)  HR: 86 (21 Mar 2020 20:38) (86 - 98)  BP: 121/60 (21 Mar 2020 20:38) (121/60 - 155/76)  BP(mean): --  RR: 18 (21 Mar 2020 20:38) (18 - 36)  SpO2: 97% (21 Mar 2020 20:38) (92% - 97%)      PHYSICAL EXAM:   · CONSTITUTIONAL: Well appearing, awake, alert, oriented to person, place, time/situation and in no apparent distress.  · EYES: Clear bilaterally, pupils equal, round and reactive to light.  · CARDIAC: Normal rate, regular rhythm.  Heart sounds S1, S2.  No murmurs, rubs or gallops.  · RESPIRATORY: mild B/L expiatory wheezing  · GASTROINTESTINAL: Abdomen soft, non-tender, no guarding.  · MUSCULOSKELETAL: Spine appears normal, range of motion is not limited, no muscle or joint tenderness  · NEUROLOGICAL: Alert and oriented, no focal deficits, no motor or sensory deficits.  · SKIN: Skin normal color for race, warm, dry and intact. No evidence of rash.

## 2020-03-21 NOTE — RAPID RESPONSE TEAM SUMMARY - NSADDTLFINDINGSRRT_GEN_ALL_CORE
Patient seen in moderate distress, coughing, notable severe rigors, sinus tachycardic 120s, and saturating > 95% on NRB but questionable given poor wave form. Otherwise mentating well.

## 2020-03-21 NOTE — ED ADULT TRIAGE NOTE - TELEPHONIC ID NUMBER OF THE INTERPRETER
C-SSRS (Frequent Screen)   2. Have you actually had any thoughts of killing yourself? Asleep   6. Have you done anything, started to do anything, or prepared to do anything to end your life?       Nursing Suicide Assessment Note - Inpatient     Current assessment:     Current C-SSRS score: (P) Negative screen= no ideation, behaviors or history      Protective Factors / Reason for Living:       Interventions:   · Implemented the Safety / Recovery Plan     Other Interventions Implemented:  · Pt sleeping, maintain current plan of care     [id number]

## 2020-03-22 NOTE — CHART NOTE - NSCHARTNOTEFT_GEN_A_CORE
Called patient's daughter Rochelle Marti on phone [phone], updated her regarding patient's grave condition, he is intubated and we placed emergent central line due to severe hypotension. Answered all her questions. She requested to visit the hospital. I told her currently  relatives are not allowed in hospital. Given her 6N phone number.     Discussed goals of care with her. She needs time to think. Currently patient is full code.

## 2020-03-22 NOTE — CONSULT NOTE ADULT - ASSESSMENT
Mr. Oleary is a 64 year old male from home lives with wife ambulates independently with no significant PMHx and has PSHx of neck surgery presents to the ED with complaints of fever x 1 week a/w cough productive of white phlegm and CXR showing B/L lower lobes infiltrate and opacities - admitted for concern of COVID, on arrival to N noted w/ abnormal vitals along w/ severe rigors. Patient was given Tylenol IV and showed improvement however patient had recurrence of rigors now a/w severe acute hypoxic respiratory failure w/ evidence of peripheral cyanosis - decision was then made to intubate the patient emergently w/ anesthesia.     Assessment  - Acute Hypoxic Respiratory Failure  - CAP  - R/O COVID    Neuro  - No acute issues    Cardio  Hypertensive  - 2/2 respiratory distress, resolved w/ intubation and sedation  - Monitor BP    Resp  Acute Hypoxic Respiratory Failure  - Likely 2/2 viral induced ARDS; C/w MV and sedation PRN    GI  - GI stress ulcer PPx w/ PPI; placing NGT, start trickle feeds    Renal  - Place FC, strict IOs    Endo  - Goal Glc < 180    Heme  - No acute issues; DVT PPx w/ Lovenox     ID   Sepsis  - 2/2 COVID versus multifocal PNA  - C/w CAP ABx  ***F/u BCx/COVID, if +'ve initiate HCQ/Thiamine/Vitamin C    Psych  - No acute issues

## 2020-03-22 NOTE — PROGRESS NOTE ADULT - SUBJECTIVE AND OBJECTIVE BOX
INTERVAL /OVERNIGHT EVENTS:     PRESSORS: [x ] YES [ ] NO  WHICH: Levophed 3/22    ANTIBIOTICS:   Rocephin               DATE STARTED:3/22  ANTIBIOTICS:    Plaquenil             DATE STARTED:  3/22      Antimicrobial:  azithromycin  IVPB 500 milliGRAM(s) IV Intermittent every 24 hours  cefTRIAXone   IVPB 1000 milliGRAM(s) IV Intermittent every 24 hours  hydroxychloroquine 400 milliGRAM(s) Oral every 12 hours    Cardiovascular:  norepinephrine Infusion 0.05 MICROgram(s)/kG/Min IV Continuous <Continuous>    Pulmonary:  ALBUTerol    90 MICROgram(s) HFA Inhaler 2 Puff(s) Inhalation every 6 hours PRN  guaiFENesin   Syrup  (Sugar-Free) 100 milliGRAM(s) Oral every 6 hours PRN    Hematalogic:  enoxaparin Injectable 40 milliGRAM(s) SubCutaneous daily    Other:  chlorhexidine 0.12% Liquid 15 milliLiter(s) Oral Mucosa every 12 hours  chlorhexidine 4% Liquid 1 Application(s) Topical daily  chlorhexidine 4% Liquid 1 Application(s) Topical <User Schedule>  midazolam Infusion 0.02 mG/kG/Hr IV Continuous <Continuous>  pantoprazole    Tablet 40 milliGRAM(s) Oral before breakfast  sodium chloride 0.9% lock flush 10 milliLiter(s) IV Push every 1 hour PRN    ALBUTerol    90 MICROgram(s) HFA Inhaler 2 Puff(s) Inhalation every 6 hours PRN  azithromycin  IVPB 500 milliGRAM(s) IV Intermittent every 24 hours  cefTRIAXone   IVPB 1000 milliGRAM(s) IV Intermittent every 24 hours  chlorhexidine 0.12% Liquid 15 milliLiter(s) Oral Mucosa every 12 hours  chlorhexidine 4% Liquid 1 Application(s) Topical daily  chlorhexidine 4% Liquid 1 Application(s) Topical <User Schedule>  enoxaparin Injectable 40 milliGRAM(s) SubCutaneous daily  guaiFENesin   Syrup  (Sugar-Free) 100 milliGRAM(s) Oral every 6 hours PRN  hydroxychloroquine 400 milliGRAM(s) Oral every 12 hours  midazolam Infusion 0.02 mG/kG/Hr IV Continuous <Continuous>  norepinephrine Infusion 0.05 MICROgram(s)/kG/Min IV Continuous <Continuous>  pantoprazole    Tablet 40 milliGRAM(s) Oral before breakfast  sodium chloride 0.9% lock flush 10 milliLiter(s) IV Push every 1 hour PRN    Drug Dosing Weight  Height (cm): 172.72 (21 Mar 2020 14:43)  Weight (kg): 72.6 (21 Mar 2020 14:43)  BMI (kg/m2): 24.3 (21 Mar 2020 14:43)  BSA (m2): 1.86 (21 Mar 2020 14:43)    CENTRAL LINE: [ x] YES [ ] NO  LOCATION:   DATE INSERTED:  REMOVE: [ ] YES [ ] NO  EXPLAIN:    ARANGO: [x ] YES [ ] NO    DATE INSERTED:  REMOVE:  [ ] YES [ ] NO  EXPLAIN:    A-LINE:  [x ] YES [ ] NO  LOCATION:   DATE INSERTED:  REMOVE:  [ ] YES [ ] NO  EXPLAIN:      ICU Vital Signs Last 24 Hrs  T(C): 38.7 (22 Mar 2020 11:00), Max: 39.3 (22 Mar 2020 09:00)  T(F): 101.7 (22 Mar 2020 11:00), Max: 102.8 (22 Mar 2020 09:00)  HR: 99 (22 Mar 2020 10:00) (86 - 117)  BP: 89/71 (22 Mar 2020 10:00) (55/35 - 184/84)  BP(mean): 74 (22 Mar 2020 10:00) (39 - 74)  ABP: --  ABP(mean): --  RR: 40 (21 Mar 2020 23:07) (18 - 40)  SpO2: 100% (22 Mar 2020 10:00) (55% - 100%)      Mode: AC/ CMV (Assist Control/ Continuous Mandatory Ventilation)  RR (machine): 30  TV (machine): 450  FiO2: 100  PEEP: 18  ITime: 1  MAP: 28.5  PIP: 36      PHYSICAL EXAM:    GENERAL:   HEAD: atraumatic, normocephalic   ENMT: nasal mucosa- Oral cavity- dry  NECK: supple, JVD/ no JVD, thyroid-   SKIN: warm, dry   CHEST/LUNG: ET tube: size        cm at lip. + rhonchi., no wheezing   HEART: RRR, no m/r/g   ABDOMEN: soft, nontender, nondistended; bowel sounds.  : arango catheter.  EXTREMITIES: +1 non-pitting edema, no cyanosis, no clubbing.  NEURO: sedated         LABS:  CBC Full  -  ( 22 Mar 2020 12:26 )  WBC Count : 10.33 K/uL  RBC Count : 4.11 M/uL  Hemoglobin : 13.0 g/dL  Hematocrit : 41.8 %  Platelet Count - Automated : 189 K/uL  Mean Cell Volume : 101.7 fl  Mean Cell Hemoglobin : 31.6 pg  Mean Cell Hemoglobin Concentration : 31.1 gm/dL  Auto Neutrophil # : 9.81 K/uL  Auto Lymphocyte # : 0.31 K/uL  Auto Monocyte # : 0.21 K/uL  Auto Eosinophil # : 0.00 K/uL  Auto Basophil # : 0.00 K/uL  Auto Neutrophil % : 93.0 %  Auto Lymphocyte % : 3.0 %  Auto Monocyte % : 2.0 %  Auto Eosinophil % : 0.0 %  Auto Basophil % : 0.0 %        145  |  115<H>  |  21<H>  ----------------------------<  139<H>  4.5   |  17<L>  |  1.77<H>    Ca    6.7<L>      22 Mar 2020 12:26  Phos  5.6       Mg     2.0         TPro  7.8  /  Alb  2.6<L>  /  TBili  0.5  /  DBili  x   /  AST  32  /  ALT  25  /  AlkPhos  68  03-21    PT/INR - ( 21 Mar 2020 17:04 )   PT: 13.9 sec;   INR: 1.22 ratio         PTT - ( 21 Mar 2020 17:04 )  PTT:29.8 sec  Urinalysis Basic - ( 21 Mar 2020 17:34 )    Color: Yellow / Appearance: Clear / S.020 / pH: x  Gluc: x / Ketone: Trace  / Bili: Negative / Urobili: Negative   Blood: x / Protein: 100 / Nitrite: Negative   Leuk Esterase: Negative / RBC: 2-5 /HPF / WBC 0-2 /HPF   Sq Epi: x / Non Sq Epi: Few /HPF / Bacteria: Few /HPF          RADIOLOGY & ADDITIONAL STUDIES REVIEWED:   < from: Xray Chest 1 View- PORTABLE-Urgent (20 @ 11:40) >    EXAM:  XR CHEST PORTABLE URGENT 1V                            PROCEDURE DATE:  2020          INTERPRETATION:  Exam Date: 3/22/2020 11:40 AM    History: Covid positive, intubated    Technique: Single frontal portable view of the chest with comparison to  study of earlier in the day    Findings:    Endotracheal tube in place. NG tube into the stomach. Right internal jugular line in place. Heart is slightly prominent. Right perihilar opacity new from prior exam. The apices and hemidiaphragmsare unremarkable. Degenerative changes of the visualized osseous structures.        Impression:    Lines in place. Right perihilar opacity new from prior exam.    < end of copied text >      [ ]GOALS OF CARE DISCUSSION WITH PATIENT/FAMILY/PROXY:    CRITICAL CARE TIME SPENT: 35 minutes

## 2020-03-22 NOTE — CONSULT NOTE ADULT - SUBJECTIVE AND OBJECTIVE BOX
Patient is a 64y old  Male who presents with a chief complaint of CAP and r/o COVID (21 Mar 2020 20:11)      INTERVAL HPI/OVERNIGHT EVENTS:  T(C): 36.6 (20 @ 23:07), Max: 38.6 (20 @ 22:50)  HR: 99 (20 @ 23:07) (86 - 117)  BP: 143/73 (20 @ 23:07) (121/60 - 184/84)  RR: 40 (20 @ 23:07) (18 - 40)  SpO2: 96% (20 @ 23:07) (55% - 100%)  Wt(kg): --  I&O's Summary      PAST MEDICAL & SURGICAL HISTORY:  No pertinent past medical history  No significant past surgical history      SOCIAL HISTORY  Alcohol:  Tobacco:  Illicit substance use:      FAMILY HISTORY:      LABS:                        13.4   9.18  )-----------( 213      ( 21 Mar 2020 17:04 )             40.8     03-    140  |  104  |  18  ----------------------------<  132<H>  4.0   |  27  |  1.06    Ca    8.5      21 Mar 2020 17:04    TPro  7.8  /  Alb  2.6<L>  /  TBili  0.5  /  DBili  x   /  AST  32  /  ALT  25  /  AlkPhos  68  03-21    PT/INR - ( 21 Mar 2020 17:04 )   PT: 13.9 sec;   INR: 1.22 ratio         PTT - ( 21 Mar 2020 17:04 )  PTT:29.8 sec  Urinalysis Basic - ( 21 Mar 2020 17:34 )    Color: Yellow / Appearance: Clear / S.020 / pH: x  Gluc: x / Ketone: Trace  / Bili: Negative / Urobili: Negative   Blood: x / Protein: 100 / Nitrite: Negative   Leuk Esterase: Negative / RBC: 2-5 /HPF / WBC 0-2 /HPF   Sq Epi: x / Non Sq Epi: Few /HPF / Bacteria: Few /HPF      CAPILLARY BLOOD GLUCOSE      POCT Blood Glucose.: 180 mg/dL (22 Mar 2020 06:58)  POCT Blood Glucose.: 107 mg/dL (21 Mar 2020 22:56)        Urinalysis Basic - ( 21 Mar 2020 17:34 )    Color: Yellow / Appearance: Clear / S.020 / pH: x  Gluc: x / Ketone: Trace  / Bili: Negative / Urobili: Negative   Blood: x / Protein: 100 / Nitrite: Negative   Leuk Esterase: Negative / RBC: 2-5 /HPF / WBC 0-2 /HPF   Sq Epi: x / Non Sq Epi: Few /HPF / Bacteria: Few /HPF        MEDICATIONS  (STANDING):  azithromycin  IVPB 500 milliGRAM(s) IV Intermittent every 24 hours  cefTRIAXone   IVPB 1000 milliGRAM(s) IV Intermittent every 24 hours  enoxaparin Injectable 40 milliGRAM(s) SubCutaneous daily  propofol Infusion 10 MICROgram(s)/kG/Min (4.36 mL/Hr) IV Continuous <Continuous>  sodium chloride 0.9%. 1000 milliLiter(s) (75 mL/Hr) IV Continuous <Continuous>    MEDICATIONS  (PRN):  acetaminophen   Tablet .. 650 milliGRAM(s) Oral every 6 hours PRN Temp greater or equal to 38C (100.4F)  ALBUTerol    90 MICROgram(s) HFA Inhaler 2 Puff(s) Inhalation every 6 hours PRN Shortness of Breath and/or Wheezing  guaiFENesin   Syrup  (Sugar-Free) 100 milliGRAM(s) Oral every 6 hours PRN Cough      REVIEW OF SYSTEMS:  CONSTITUTIONAL: No fever, weight loss, or fatigue  EYES: No eye pain, visual disturbances, or discharge  ENMT:  No difficulty hearing, tinnitus, vertigo; No sinus or throat pain  NECK: No pain or stiffness  RESPIRATORY: No cough, wheezing, chills or hemoptysis; No shortness of breath  CARDIOVASCULAR: No chest pain, palpitations, dizziness, or leg swelling  GASTROINTESTINAL: No abdominal or epigastric pain. No nausea, vomiting, or hematemesis; No diarrhea or constipation. No melena or hematochezia.  GENITOURINARY: No dysuria, frequency, hematuria, or incontinence  NEUROLOGICAL: No headaches, memory loss, loss of strength, numbness, or tremors  SKIN: No itching, burning, rashes, or lesions   LYMPH NODES: No enlarged glands  ENDOCRINE: No heat or cold intolerance; No hair loss  MUSCULOSKELETAL: No joint pain or swelling; No muscle, back, or extremity pain  PSYCHIATRIC: No depression, anxiety, mood swings, or difficulty sleeping  HEME/LYMPH: No easy bruising, or bleeding gums  ALLERY AND IMMUNOLOGIC: No hives or eczema    RADIOLOGY & ADDITIONAL TESTS:    Imaging Personally Reviewed:  [ ] YES  [ ] NO    Consultant(s) Notes Reviewed:  [ ] YES  [ ] NO    PHYSICAL EXAM:  GENERAL: NAD, well-groomed, well-developed  HEAD:  Atraumatic, Normocephalic  EYES: EOMI, PERRLA, conjunctiva and sclera clear  ENMT: No tonsillar erythema, exudates, or enlargement; Moist mucous membranes, Good dentition, No lesions  NECK: Supple, No JVD, Normal thyroid  NERVOUS SYSTEM:  Alert & Oriented X3, Good concentration; Motor Strength 5/5 B/L upper and lower extremities; DTRs 2+ intact and symmetric  CHEST/LUNG: Clear to percussion bilaterally; No rales, rhonchi, wheezing, or rubs  HEART: Regular rate and rhythm; No murmurs, rubs, or gallops  ABDOMEN: Soft, Nontender, Nondistended; Bowel sounds present  EXTREMITIES:  2+ Peripheral Pulses, No clubbing, cyanosis, or edema  LYMPH: No lymphadenopathy noted  SKIN: No rashes or lesions    Care Discussed with Consultants/Other Providers [ ] YES  [ ] NO Mr. Oleary is a 64 year old male from home lives with wife ambulates independently with no significant PMHx and has PSHx of neck surgery presents to the ED with complaints of fever x 1 week a/w cough productive of white phlegm and CXR showing B/L lower lobes infiltrate and opacities - admitted for concern of COVID, on arrival to N noted w/ abnormal vitals along w/ severe rigors. Patient was given Tylenol IV and showed improvement however patient had recurrence of rigors now a/w severe acute hypoxic respiratory failure w/ evidence of peripheral cyanosis - decision was then made to intubate the patient emergently w/ anesthesia.     REVIEW OF SYSTEMS: limited given acuity; endorsed chills, fever, cough, and dyspnea on exertion    PAST MEDICAL & SURGICAL HISTORY:  No pertinent past medical history  No significant past surgical history    SOCIAL HISTORY - unable to obtain 2/2 acute distress    FAMILY HISTORY - unable to obtain 2/2 acute distress    MEDICATIONS  (STANDING):  azithromycin  IVPB 500 milliGRAM(s) IV Intermittent every 24 hours  cefTRIAXone   IVPB 1000 milliGRAM(s) IV Intermittent every 24 hours  enoxaparin Injectable 40 milliGRAM(s) SubCutaneous daily  propofol Infusion 10 MICROgram(s)/kG/Min (4.36 mL/Hr) IV Continuous <Continuous>  sodium chloride 0.9%. 1000 milliLiter(s) (75 mL/Hr) IV Continuous <Continuous>    MEDICATIONS  (PRN):  acetaminophen   Tablet .. 650 milliGRAM(s) Oral every 6 hours PRN Temp greater or equal to 38C (100.4F)  ALBUTerol    90 MICROgram(s) HFA Inhaler 2 Puff(s) Inhalation every 6 hours PRN Shortness of Breath and/or Wheezing  guaiFENesin   Syrup  (Sugar-Free) 100 milliGRAM(s) Oral every 6 hours PRN Cough    ICU Vital Signs Last 24 Hrs  T(C): 36.6 (21 Mar 2020 23:07), Max: 38.6 (21 Mar 2020 22:50)  T(F): 97.9 (21 Mar 2020 23:07), Max: 101.5 (21 Mar 2020 22:50)  HR: 99 (21 Mar 2020 23:07) (86 - 117)  BP: 143/73 (21 Mar 2020 23:07) (121/60 - 184/84)  RR: 40 (21 Mar 2020 23:07) (18 - 40)  SpO2: 96% (21 Mar 2020 23:07) (55% - 100%)    PHYSICAL EXAM:   CONSTITUTIONAL: severe respiratory distress,   EYES: Clear bilaterally, pupils equal, round and reactive to light.  CARDIAC: sinus tachycardic  RESPIRATORY: moderate B/L diffuse inspiratory and expiratory rhonchi  GASTROINTESTINAL: Abdomen soft, non-tender, no guarding.  MUSCULOSKELETAL: Spine appears normal, range of motion is not limited, no muscle or joint tenderness  NEUROLOGICAL: Alert and oriented, no focal deficits, no motor or sensory deficits.  SKIN: Skin normal color for race, warm, dry and intact. No evidence of rash.    LABS:                        13.4   9.18  )-----------( 213      ( 21 Mar 2020 17:04 )             40.8     03-21    140  |  104  |  18  ----------------------------<  132<H>  4.0   |  27  |  1.06    Ca    8.5      21 Mar 2020 17:04    TPro  7.8  /  Alb  2.6<L>  /  TBili  0.5  /  DBili  x   /  AST  32  /  ALT  25  /  AlkPhos  68  03-21    PT/INR - ( 21 Mar 2020 17:04 )   PT: 13.9 sec;   INR: 1.22 ratio       PTT - ( 21 Mar 2020 17:04 )  PTT:29.8 sec  Urinalysis Basic - ( 21 Mar 2020 17:34 )    Color: Yellow / Appearance: Clear / S.020 / pH: x  Gluc: x / Ketone: Trace  / Bili: Negative / Urobili: Negative   Blood: x / Protein: 100 / Nitrite: Negative   Leuk Esterase: Negative / RBC: 2-5 /HPF / WBC 0-2 /HPF   Sq Epi: x / Non Sq Epi: Few /HPF / Bacteria: Few /HPF    CAPILLARY BLOOD GLUCOSE    POCT Blood Glucose.: 180 mg/dL (22 Mar 2020 06:58)  POCT Blood Glucose.: 107 mg/dL (21 Mar 2020 22:56)    Urinalysis Basic - ( 21 Mar 2020 17:34 )    Color: Yellow / Appearance: Clear / S.020 / pH: x  Gluc: x / Ketone: Trace  / Bili: Negative / Urobili: Negative   Blood: x / Protein: 100 / Nitrite: Negative   Leuk Esterase: Negative / RBC: 2-5 /HPF / WBC 0-2 /HPF   Sq Epi: x / Non Sq Epi: Few /HPF / Bacteria: Few /HPF      RADIOLOGY & ADDITIONAL TESTS:    Imaging Personally Reviewed:  [x] YES  [ ] NO    Consultant(s) Notes Reviewed:  [x] YES  [ ] NO    Care Discussed with Consultants/Other Providers [x] YES  [ ] NO

## 2020-03-22 NOTE — PROCEDURE NOTE - NSTRACHPOSTINTU_RESP_A_CORE
Chest excursion noted
Breath sounds bilateral/Appropriate capnography/Breath sounds equal/Positive end tidal Co2 noted

## 2020-03-22 NOTE — PROGRESS NOTE ADULT - ASSESSMENT
Assessment  - Acute Hypoxic Respiratory Failure - intubated   - CAP - On Rocephin  - R/O COVID - on Plaquenil, Vitamin C, Thiamine - approval done by Dr. scales    Neuro  - sedated. On versed    Cardio  Hypotension  - Pt initially on Pheny but pt still hypotensive,  Pt now on Levophed  - Monitor BP    Resp  Acute Hypoxic Respiratory Failure  - 2/2 respiratory distress, resolved w/ intubation and sedation  - Likely 2/2 viral induced ARDS; C/w MV and sedation PRN  - Follow up ABG, Daily Xray    GI  - GI stress ulcer PPx w/ PPI; placing NGT, start trickle feeds    Renal  - Place FC, strict IOs    Endo  - Goal Glc < 180    Heme  - No acute issues; DVT PPx w/ Lovenox     ID   Sepsis  - 2/2 COVID versus multifocal PNA  - C/w CAP ABx  ***F/u BCx/COVID Assessment  - Acute Hypoxic Respiratory Failure - intubated   - CAP - On Rocephin, zithromax  - R/O COVID - on Plaquenil, Vitamin C, Thiamine - approval done by Dr. scales    Neuro  - sedated. On versed, start hydromorphone infusion    Cardio  Hypotension  - Pt initially on Pheny but pt still hypotensive,  Pt now on Levophed  - Monitor BP  - Elevated Trop - most like to demand ischemia, f/u trop   - Cardiac consult     Resp  Acute Hypoxic Respiratory Failure  - 2/2 respiratory distress, resolved w/ intubation and sedation  - Likely 2/2 viral induced ARDS; C/w MV and sedation PRN  - Follow up ABG, Daily Xray    GI  - GI stress ulcer PPx w/ PPI; placing NGT, start trickle feeds    Renal  - Place FC, strict IOs    Endo  - Goal Glc < 180    Heme  - No acute issues; DVT PPx w/ Lovenox     ID   Sepsis  - 2/2 COVID versus multifocal PNA  - C/w CAP ABx  ***F/u BCx/COVID

## 2020-03-22 NOTE — PROCEDURE NOTE - NSPROCDETAILS_GEN_ALL_CORE
patient pre-oxygenated, tube inserted, placement confirmed/connected to ventilator
patient pre-oxygenated, tube inserted, placement confirmed
positive blood return obtained via catheter/all materials/supplies accounted for at end of procedure/location identified, draped/prepped, sterile technique used, needle inserted/introduced/connected to a pressurized flush line/hemostasis with direct pressure, dressing applied/Seldinger technique
sterile dressing applied/sterile technique, catheter placed/ultrasound guidance/lumen(s) aspirated and flushed/guidewire recovered

## 2020-03-22 NOTE — CONSULT NOTE ADULT - ATTENDING COMMENTS
This is a 64 yr old  man with any significant medical condition presented with fever cough x 7 days. He was admitted to icu for acute hypoxic resp failure due to pna, presumed COVID-19 infection , on vent support. ID gisel Jesus. Pat was initally intubated with size 6 ETT, this replaced by 7.5 via tube changer      Sugg:  -continue vent support  -f/u blood gas  -continue antibx  -f/u covid-19 result  -hemodynamic support  -morphine drip  -f/u blood gas and adjust vent This is a 64 yr old  man with any significant medical condition presented with fever cough x 7 days. He was admitted to icu for acute hypoxic resp failure due to pna, presumed COVID-19 infection , on vent support. ID gisel Jesus. Pat was initally intubated with size 6 ETT, this replaced by 7.5 via tube changer. Elevated troponin is due to demand ischemia      Sugg:  -continue vent support  -f/u blood gas  -continue antibx  -f/u covid-19 result  -hemodynamic support  -morphine drip  -f/u blood gas and adjust vent  -ACS work up, echo, cards

## 2020-03-23 NOTE — DIETITIAN INITIAL EVALUATION ADULT. - ENTERAL
with Propofol@ 19.6: Jevity 1.5 25x24 + 1 Pkt Prosource TID (Jevity 1.5 600 ml, 900 kcals, 38 gm protein. 3 Prosource add 45 gm protein, 180 kcal). MD to monitor. RD available.

## 2020-03-23 NOTE — CONSULT NOTE ADULT - ASSESSMENT
Patient is a 63yo Male with no PMH h/o neck surgery in the past p/w fever and cough x 1 week. With no improvement despite PO antibiotics. CXR with multifocal PNA. Pt developed severe rigors, pt became tachypneic/ destating with signs of cyanosis s/p intubation for acute hypoxic respiratory failure. Pt admitted to MICU for Septic Shock 2/2 multifocal PNA due to +COVID-19 and Hypotension on pressors. Pt developing worsening renal failure.   Nephrology consulted for Elevated serum creatinine. Patient is a 65yo Male with no PMH h/o neck surgery in the past p/w fever and cough x 1 week. With no improvement despite PO antibiotics. CXR with multifocal PNA. Pt developed severe rigors, pt became tachypneic/ destating with signs of cyanosis s/p intubation for acute hypoxic respiratory failure. Pt admitted to MICU for Septic Shock 2/2 multifocal PNA due to +COVID-19 and Hypotension on pressors. Pt developing worsening renal failure.   Nephrology consulted for Elevated serum creatinine.    1. TYLER- SCr 1.06 on admission. Anuric TYLER in the setting of septic shock 2/2 COVID-19/ hypotension likely ATN. FeNa 0.5%; Recc 1/2 NS with 75meq bicarb @ 100 cc/hr. Unable to perform TTE at this time as per Cards.  If patient remains anuric and /or worsening renal function; will initiate HD 3/24. Check HepBsAg. Discussed risk/ benefits/ alternative of HD with pt's daughter, Rochelle Bruno.   Verbal consent given. Check Renal US.  Strict I/Os. Avoid nephrotoxins/ NSAIDs/ RCA. Monitor BMP.  2. Septic Shock 2/2 Multifocal PNA in the setting of COVID-19- Pt on abx/ vent support with Hydroxychloroquine/ Thiamine as per ICU  3. Hypotension- Pressors as per ICU  4. Acute hypoxic respiratory failure- vent support as per ICU.   5. Hyperkalemia- in the setting of renal failure. Bicarb gtt as above. Change TF with Nepro. Start Lokelma 10g tid via NGT x 48hrs. Monitor serum K.

## 2020-03-23 NOTE — CONSULT NOTE ADULT - SUBJECTIVE AND OBJECTIVE BOX
[facility] NEPHROLOGY- CONSULTATION NOTE    Patient is a 65yo Male with no PMH h/o neck surgery in the past p/w fever and cough x 1 week. With no improvement despite PO antibiotics. CXR with multifocal PNA. Pt developed severe rigors, pt became tachypneic/ destating with signs of cyanosis s/p intubation for acute hypoxic respiratory failure. Pt admitted to MICU for Septic Shock 2/2 multifocal PNA due to +COVID-19 and Hypotension on pressors. Pt developing worsening renal failure.   Nephrology consulted for Elevated serum creatinine.    Unable to obtain history from patient.     PAST MEDICAL & SURGICAL HISTORY:  No pertinent past medical history  No significant past surgical history    No Known Allergies    Home Medications Reviewed  Hospital Medications:   MEDICATIONS  (STANDING):  aspirin  chewable 81 milliGRAM(s) Oral daily  azithromycin  IVPB 500 milliGRAM(s) IV Intermittent every 24 hours  cefTRIAXone   IVPB 1000 milliGRAM(s) IV Intermittent every 24 hours  chlorhexidine 0.12% Liquid 15 milliLiter(s) Oral Mucosa every 12 hours  chlorhexidine 2% Cloths 1 Application(s) Topical daily  enoxaparin Injectable 40 milliGRAM(s) SubCutaneous daily  HYDROmorphone Infusion 1.5 mG/Hr (1.5 mL/Hr) IV Continuous <Continuous>  hydroxychloroquine 200 milliGRAM(s) Oral two times a day  norepinephrine Infusion 0.05 MICROgram(s)/kG/Min (3.4 mL/Hr) IV Continuous <Continuous>  pantoprazole    Tablet 40 milliGRAM(s) Oral before breakfast  propofol Infusion 10 MICROgram(s)/kG/Min (4.36 mL/Hr) IV Continuous <Continuous>  sodium bicarbonate 325 milliGRAM(s) Oral three times a day  thiamine IVPB 500 milliGRAM(s) IV Intermittent daily    SH: Denies smoking, alcohol, illicit drug use.     REVIEW OF SYSTEMS: Unable to obtain    VITALS:  T(F): 99.9 (20 @ 23:00), Max: 101.5 (20 @ 17:00)  HR: 85 (20 @ 15:00)  BP: 141/76 (20 @ 07:30)  RR: 32 (20 @ 15:00)  SpO2: 100% (20 @ 15:00)  Wt(kg): --     @ 07: @ 07:00  --------------------------------------------------------  IN: 2221.5 mL / OUT: 95 mL / NET: 2126.5 mL     @ 07: @ 15:49  --------------------------------------------------------  IN: 863 mL / OUT: 15 mL / NET: 848 mL        PHYSICAL EXAM:  Gen: Sedated  HEENT: intubated  Cards: RRR, +S1/S2,   Resp: +mechanical BS  GI: soft, ND  : +arango with minimal urine o/p  Extremities: no LE edema B/L  Derm: no rashes  Neuro: Sedated    LABS:      144  |  116<H>  |  36<H>  ----------------------------<  141<H>  5.9<H>   |  16<L>  |  3.80<H>    Ca    7.0<L>      23 Mar 2020 06:09  Phos  7.2       Mg     2.2         TPro  6.3  /  Alb  1.9<L>  /  TBili  0.4  /  DBili      /  AST  5550<H>  /  ALT  2807<H>  /  AlkPhos  175<H>      Creatinine Trend: 3.80 <--, 1.77 <--, 1.06 <--                        12.7   13.47 )-----------( 212      ( 23 Mar 2020 06:09 )             42.6     Urine Studies:  Urinalysis Basic - ( 21 Mar 2020 17:34 )    Color: Yellow / Appearance: Clear / S.020 / pH:   Gluc:  / Ketone: Trace  / Bili: Negative / Urobili: Negative   Blood:  / Protein: 100 / Nitrite: Negative   Leuk Esterase: Negative / RBC: 2-5 /HPF / WBC 0-2 /HPF   Sq Epi:  / Non Sq Epi: Few /HPF / Bacteria: Few /HPF      Creatinine, Random Urine: 209 mg/dL ( @ 10:49)  Sodium, Random Urine: 39 mmol/L ( @ 10:49)  Osmolality, Random Urine: 324 mos/kg ( @ 10:49)    RADIOLOGY & ADDITIONAL STUDIES:    < from: Xray Chest 1 View- PORTABLE-Routine (20 @ 12:02) >  EXAM:  XR CHEST PORTABLE ROUTINE 1V                            PROCEDURE DATE:  2020          INTERPRETATION:  CLINICAL INDICATION: 64 years  Male with covid positive.    COMPARISON: 3/22/2020 at 10:54 AM    The ET tube, NG tube and right central line remain in satisfactory position.    The previously seen right perihilar infiltrate is less pronounced than on the prior study. There is diffuse right lung haziness without focal consolidation. The left lung is clear.    The heart is mildly enlarged. There is no mediastinal or hilar mass and no pneumothorax. Pulmonary vasculature is normal.    Mild thoracic degenerative changes are and scoliosis present.    IMPRESSION:    Right perihilar infiltrate is less pronounced than on the prior study. Mild right lung haziness may reflect diffuse low-grade infiltrate.    ET tube, right central line and NG tube in satisfactory position.    < end of copied text >

## 2020-03-23 NOTE — DIETITIAN INITIAL EVALUATION ADULT. - OTHER INFO
Pt Covid-19+. Seen through glass window. Propofol @ 19.6 ml/hr (if x 24 hrs=717 kcals from fat). HD to start tomorrow.

## 2020-03-23 NOTE — DIETITIAN INITIAL EVALUATION ADULT. - PROBLEM SELECTOR PLAN 1
- p/w with worsening SOB wheezing with cough with white plegm   - PE : mild B/L expiatory wheezing   - CXR : Showing B/L Lower lobes infiltrate and opacities (f/u official report)   - no Leukocytosis  - lymphocyte; WNl   - flu A -ve   - pt is afebrile  - O2 Sat on Room air 97%   - s/p 1 dose of Rocephin and Azithro in ED.  - will start Rocephin and Zithromax  - Also Will rule out covid 19; testing : contact and airborne isolation precaution   - c/w Albuterol Inhaler  - Supportive care , AntPyeretic Tylenol PRN  and anti-tussives Robitussin PRN , Supplemental O2 via nasal cannul if needed  - f/u Bl Cx   - f/u CXR AM  - f/u Procalcitonin  , Legionella Ag , strept , mycoplasma antigen    - f/u D-Dimers , ESR , CRP , LDH, ferritin , Lactate

## 2020-03-23 NOTE — PROGRESS NOTE ADULT - SUBJECTIVE AND OBJECTIVE BOX
INTERVAL HPI/OVERNIGHT EVENTS: ***    PRESSORS: [ ] YES [ ] NO  WHICH:    ANTIBIOTICS:                  DATE STARTED:  ANTIBIOTICS:                  DATE STARTED:  ANTIBIOTICS:                  DATE STARTED:    Antimicrobial:  azithromycin  IVPB 500 milliGRAM(s) IV Intermittent every 24 hours  cefTRIAXone   IVPB 1000 milliGRAM(s) IV Intermittent every 24 hours  hydroxychloroquine 200 milliGRAM(s) Oral two times a day    Cardiovascular:  norepinephrine Infusion 0.05 MICROgram(s)/kG/Min IV Continuous <Continuous>    Pulmonary:  ALBUTerol    90 MICROgram(s) HFA Inhaler 2 Puff(s) Inhalation every 6 hours PRN  guaiFENesin   Syrup  (Sugar-Free) 100 milliGRAM(s) Oral every 6 hours PRN    Hematalogic:  aspirin  chewable 81 milliGRAM(s) Oral daily  enoxaparin Injectable 40 milliGRAM(s) SubCutaneous daily    Other:  acetaminophen    Suspension .. 650 milliGRAM(s) Oral every 6 hours PRN  chlorhexidine 0.12% Liquid 15 milliLiter(s) Oral Mucosa every 12 hours  chlorhexidine 2% Cloths 1 Application(s) Topical daily  midazolam Infusion 0.02 mG/kG/Hr IV Continuous <Continuous>  pantoprazole    Tablet 40 milliGRAM(s) Oral before breakfast  propofol Infusion 10 MICROgram(s)/kG/Min IV Continuous <Continuous>  sodium chloride 0.9% lock flush 10 milliLiter(s) IV Push every 1 hour PRN  thiamine IVPB 500 milliGRAM(s) IV Intermittent daily    acetaminophen    Suspension .. 650 milliGRAM(s) Oral every 6 hours PRN  ALBUTerol    90 MICROgram(s) HFA Inhaler 2 Puff(s) Inhalation every 6 hours PRN  aspirin  chewable 81 milliGRAM(s) Oral daily  azithromycin  IVPB 500 milliGRAM(s) IV Intermittent every 24 hours  cefTRIAXone   IVPB 1000 milliGRAM(s) IV Intermittent every 24 hours  chlorhexidine 0.12% Liquid 15 milliLiter(s) Oral Mucosa every 12 hours  chlorhexidine 2% Cloths 1 Application(s) Topical daily  enoxaparin Injectable 40 milliGRAM(s) SubCutaneous daily  guaiFENesin   Syrup  (Sugar-Free) 100 milliGRAM(s) Oral every 6 hours PRN  hydroxychloroquine 200 milliGRAM(s) Oral two times a day  midazolam Infusion 0.02 mG/kG/Hr IV Continuous <Continuous>  norepinephrine Infusion 0.05 MICROgram(s)/kG/Min IV Continuous <Continuous>  pantoprazole    Tablet 40 milliGRAM(s) Oral before breakfast  propofol Infusion 10 MICROgram(s)/kG/Min IV Continuous <Continuous>  sodium chloride 0.9% lock flush 10 milliLiter(s) IV Push every 1 hour PRN  thiamine IVPB 500 milliGRAM(s) IV Intermittent daily    Drug Dosing Weight  Height (cm): 172.72 (21 Mar 2020 14:43)  Weight (kg): 72.6 (21 Mar 2020 14:43)  BMI (kg/m2): 24.3 (21 Mar 2020 14:43)  BSA (m2): 1.86 (21 Mar 2020 14:43)    CENTRAL LINE: [ ] YES [ ] NO  LOCATION:   DATE INSERTED:  REMOVE: [ ] YES [ ] NO  EXPLAIN:    MAHMOOD: [ ] YES [ ] NO    DATE INSERTED:  REMOVE:  [ ] YES [ ] NO  EXPLAIN:    A-LINE:  [ ] YES [ ] NO  LOCATION:   DATE INSERTED:  REMOVE:  [ ] YES [ ] NO  EXPLAIN:    PMH -reviewed admission note, no change since admission  Heart faliure: acute [ ] chronic [ ] acute or chronic [ ] diastolic [ ] systolic [ ] combied systolic and diastolic[ ]  TYLER: ATN[ ] renal medullary necrosis [ ] CKD I [ ]CKDII [ ]CKD III [ ]CKD IV [ ]CKD V [ ]Other pathological lesions [ ]  Abdominal Nutrition Status: malnutrition [ ] cachexia [ ] morbid obesity/BMI=40 [ ] Supplement ordered [___________]     ICU Vital Signs Last 24 Hrs  T(C): 37.7 (22 Mar 2020 23:00), Max: 39.3 (22 Mar 2020 09:00)  T(F): 99.9 (22 Mar 2020 23:00), Max: 102.8 (22 Mar 2020 09:00)  HR: 90 (23 Mar 2020 07:02) (68 - 110)  BP: 93/67 (22 Mar 2020 14:00) (55/35 - 122/84)  BP(mean): 73 (22 Mar 2020 14:00) (39 - 93)  ABP: 143/78 (23 Mar 2020 07:02) (62/40 - 156/82)  ABP(mean): 93 (23 Mar 2020 07:02) (50 - 103)  RR: 32 (23 Mar 2020 07:02) (26 - 40)  SpO2: 100% (23 Mar 2020 07:02) (88% - 100%)      ABG - ( 23 Mar 2020 06:41 )  pH, Arterial: 7.19  pH, Blood: x     /  pCO2: 41    /  pO2: 133   / HCO3: 15    / Base Excess: -12.3 /  SaO2: 98                    03- @ 07:01  -  03-23 @ 07:00  --------------------------------------------------------  IN: 2101.5 mL / OUT: 95 mL / NET: 2006.5 mL        Mode: AC/ CMV (Assist Control/ Continuous Mandatory Ventilation)  RR (machine): 28  TV (machine): 400  FiO2: 45  PEEP: 19  ITime: 1  MAP: 24  PIP: 30      >>> <<<    PHYSICAL EXAM:    GENERAL: NAD, well-groomed, well-developed  HEAD:  Atraumatic, Normocephalic  EYES: EOMI, PERRLA, conjunctiva and sclera clear  ENMT: No tonsillar erythema, exudates, or enlargement; Moist mucous membranes, Good dentition, No lesions  NECK: Supple, normal appearance, No JVD; Normal thyroid; Trachea midline  NERVOUS SYSTEM:  Alert & Oriented X3, Good concentration; Motor Strength 5/5 B/L upper and lower extremities; DTRs 2+ intact and symmetric  CHEST/LUNG: No chest deformity; Normal percussion bilaterally; No rales, rhonchi, wheezing   HEART: Regular rate and rhythm; No murmurs, rubs, or gallops  ABDOMEN: Soft, Nontender, Nondistended; Bowel sounds present  EXTREMITIES:  2+ Peripheral Pulses, No clubbing, cyanosis, or edema  LYMPH: No lymphadenopathy noted  SKIN: No rashes or lesions; Good capillary refill      LABS:  CBC Full  -  ( 23 Mar 2020 06:09 )  WBC Count : 13.47 K/uL  RBC Count : 4.11 M/uL  Hemoglobin : 12.7 g/dL  Hematocrit : 42.6 %  Platelet Count - Automated : 212 K/uL  Mean Cell Volume : 103.6 fl  Mean Cell Hemoglobin : 30.9 pg  Mean Cell Hemoglobin Concentration : 29.8 gm/dL  Auto Neutrophil # : SEE NOTE K/uL  Auto Lymphocyte # : SEE NOTE K/uL  Auto Monocyte # : SEE NOTE K/uL  Auto Eosinophil # : SEE NOTE K/uL  Auto Basophil # : SEE NOTE K/uL  Auto Neutrophil % : SEE NOTE %  Auto Lymphocyte % : SEE NOTE %  Auto Monocyte % : SEE NOTE %  Auto Eosinophil % : SEE NOTE %  Auto Basophil % : SEE NOTE %        144  |  116<H>  |  36<H>  ----------------------------<  141<H>  5.9<H>   |  16<L>  |  3.80<H>    Ca    7.0<L>      23 Mar 2020 06:09  Phos  7.2       Mg     2.2         TPro  6.3  /  Alb  1.9<L>  /  TBili  0.4  /  DBili  x   /  AST  5550<H>  /  ALT  2807<H>  /  AlkPhos  175<H>      PT/INR - ( 21 Mar 2020 17:04 )   PT: 13.9 sec;   INR: 1.22 ratio         PTT - ( 21 Mar 2020 17:04 )  PTT:29.8 sec  Urinalysis Basic - ( 21 Mar 2020 17:34 )    Color: Yellow / Appearance: Clear / S.020 / pH: x  Gluc: x / Ketone: Trace  / Bili: Negative / Urobili: Negative   Blood: x / Protein: 100 / Nitrite: Negative   Leuk Esterase: Negative / RBC: 2-5 /HPF / WBC 0-2 /HPF   Sq Epi: x / Non Sq Epi: Few /HPF / Bacteria: Few /HPF      Culture Results:   <10,000 CFU/mL Normal Urogenital Solange ( @ 01:12)  Culture Results:   No growth to date. ( @ 00:53)  Culture Results:   No growth to date. ( @ 00:53)      RADIOLOGY & ADDITIONAL STUDIES REVIEWED:  ***    [ ]GOALS OF CARE DISCUSSION WITH PATIENT/FAMILY/PROXY:    CRITICAL CARE TIME SPENT: 35 minutes INTERVAL HPI/OVERNIGHT EVENTS: pt was noted to be in mixed metabolic and respiratory acidosis -> VENT setting were adjusted -> RR was increased to 32-> 1amp of bicarb given   PRESSORS: [x ] YES [ ] NO  WHICH:      Antimicrobial:  azithromycin  IVPB 500 milliGRAM(s) IV Intermittent every 24 hours  cefTRIAXone   IVPB 1000 milliGRAM(s) IV Intermittent every 24 hours  hydroxychloroquine 200 milliGRAM(s) Oral two times a day    Cardiovascular:  norepinephrine Infusion 0.05 MICROgram(s)/kG/Min IV Continuous <Continuous>    Pulmonary:  ALBUTerol    90 MICROgram(s) HFA Inhaler 2 Puff(s) Inhalation every 6 hours PRN  guaiFENesin   Syrup  (Sugar-Free) 100 milliGRAM(s) Oral every 6 hours PRN    Hematalogic:  aspirin  chewable 81 milliGRAM(s) Oral daily  enoxaparin Injectable 40 milliGRAM(s) SubCutaneous daily    Other:  acetaminophen    Suspension .. 650 milliGRAM(s) Oral every 6 hours PRN  chlorhexidine 0.12% Liquid 15 milliLiter(s) Oral Mucosa every 12 hours  chlorhexidine 2% Cloths 1 Application(s) Topical daily  midazolam Infusion 0.02 mG/kG/Hr IV Continuous <Continuous>  pantoprazole    Tablet 40 milliGRAM(s) Oral before breakfast  propofol Infusion 10 MICROgram(s)/kG/Min IV Continuous <Continuous>  sodium chloride 0.9% lock flush 10 milliLiter(s) IV Push every 1 hour PRN  thiamine IVPB 500 milliGRAM(s) IV Intermittent daily    acetaminophen    Suspension .. 650 milliGRAM(s) Oral every 6 hours PRN  ALBUTerol    90 MICROgram(s) HFA Inhaler 2 Puff(s) Inhalation every 6 hours PRN  aspirin  chewable 81 milliGRAM(s) Oral daily  azithromycin  IVPB 500 milliGRAM(s) IV Intermittent every 24 hours  cefTRIAXone   IVPB 1000 milliGRAM(s) IV Intermittent every 24 hours  chlorhexidine 0.12% Liquid 15 milliLiter(s) Oral Mucosa every 12 hours  chlorhexidine 2% Cloths 1 Application(s) Topical daily  enoxaparin Injectable 40 milliGRAM(s) SubCutaneous daily  guaiFENesin   Syrup  (Sugar-Free) 100 milliGRAM(s) Oral every 6 hours PRN  hydroxychloroquine 200 milliGRAM(s) Oral two times a day  midazolam Infusion 0.02 mG/kG/Hr IV Continuous <Continuous>  norepinephrine Infusion 0.05 MICROgram(s)/kG/Min IV Continuous <Continuous>  pantoprazole    Tablet 40 milliGRAM(s) Oral before breakfast  propofol Infusion 10 MICROgram(s)/kG/Min IV Continuous <Continuous>  sodium chloride 0.9% lock flush 10 milliLiter(s) IV Push every 1 hour PRN  thiamine IVPB 500 milliGRAM(s) IV Intermittent daily    Drug Dosing Weight  Height (cm): 172.72 (21 Mar 2020 14:43)  Weight (kg): 72.6 (21 Mar 2020 14:43)  BMI (kg/m2): 24.3 (21 Mar 2020 14:43)  BSA (m2): 1.86 (21 Mar 2020 14:43)    CENTRAL LINE: [ x] YES [ ] NO  LOCATION: [facility]  DATE INSERTED:3.22  REMOVE: [ ] YES [ ] NO  EXPLAIN:    ARANGO: [ ] YES [ ] NO    DATE INSERTED:  REMOVE:  [ ] YES [ ] NO  EXPLAIN:    A-LINE:  [ X] YES [ ] NO  LOCATION:   DATE INSERTED:3.22  REMOVE:  [ ] YES [ ] NO  EXPLAIN:    ICU Vital Signs Last 24 Hrs  T(C): 37.7 (22 Mar 2020 23:00), Max: 39.3 (22 Mar 2020 09:00)  T(F): 99.9 (22 Mar 2020 23:00), Max: 102.8 (22 Mar 2020 09:00)  HR: 90 (23 Mar 2020 07:02) (68 - 110)  BP: 93/67 (22 Mar 2020 14:00) (55/35 - 122/84)  BP(mean): 73 (22 Mar 2020 14:00) (39 - 93)  ABP: 143/78 (23 Mar 2020 07:02) (62/40 - 156/82)  ABP(mean): 93 (23 Mar 2020 07:02) (50 - 103)  RR: 32 (23 Mar 2020 07:02) (26 - 40)  SpO2: 100% (23 Mar 2020 07:02) (88% - 100%)      ABG - ( 23 Mar 2020 06:41 )  pH, Arterial: 7.19  pH, Blood: x     /  pCO2: 41    /  pO2: 133   / HCO3: 15    / Base Excess: -12.3 /  SaO2: 98                    03-22 @ 07:01  -  03-23 @ 07:00  --------------------------------------------------------  IN: 2101.5 mL / OUT: 95 mL / NET: 2006.5 mL        Mode: AC/ CMV (Assist Control/ Continuous Mandatory Ventilation)  RR (machine): 28  TV (machine): 400  FiO2: 45  PEEP: 19  ITime: 1  MAP: 24  PIP: 30      >>> <<<    PHYSICAL EXAM:      GENERAL: sedated and intubated   HEAD: atraumatic, normocephalic   ENMT: nasal mucosa- Oral cavity- dry  NECK: supple, JVD/ no JVD, thyroid-   SKIN: warm, dry   CHEST/LUNG: intubated. + rhonchi., no wheezing   HEART: RRR, no m/r/g   ABDOMEN: soft, nontender, nondistended; bowel sounds.  : arango catheter.  EXTREMITIES: +1 non-pitting edema, no cyanosis, no clubbing.  NEURO: sedated         LABS:  CBC Full  -  ( 23 Mar 2020 06:09 )  WBC Count : 13.47 K/uL  RBC Count : 4.11 M/uL  Hemoglobin : 12.7 g/dL  Hematocrit : 42.6 %  Platelet Count - Automated : 212 K/uL  Mean Cell Volume : 103.6 fl  Mean Cell Hemoglobin : 30.9 pg  Mean Cell Hemoglobin Concentration : 29.8 gm/dL  Auto Neutrophil # : SEE NOTE K/uL  Auto Lymphocyte # : SEE NOTE K/uL  Auto Monocyte # : SEE NOTE K/uL  Auto Eosinophil # : SEE NOTE K/uL  Auto Basophil # : SEE NOTE K/uL  Auto Neutrophil % : SEE NOTE %  Auto Lymphocyte % : SEE NOTE %  Auto Monocyte % : SEE NOTE %  Auto Eosinophil % : SEE NOTE %  Auto Basophil % : SEE NOTE %        144  |  116<H>  |  36<H>  ----------------------------<  141<H>  5.9<H>   |  16<L>  |  3.80<H>    Ca    7.0<L>      23 Mar 2020 06:09  Phos  7.2       Mg     2.2         TPro  6.3  /  Alb  1.9<L>  /  TBili  0.4  /  DBili  x   /  AST  5550<H>  /  ALT  2807<H>  /  AlkPhos  175<H>      PT/INR - ( 21 Mar 2020 17:04 )   PT: 13.9 sec;   INR: 1.22 ratio         PTT - ( 21 Mar 2020 17:04 )  PTT:29.8 sec  Urinalysis Basic - ( 21 Mar 2020 17:34 )    Color: Yellow / Appearance: Clear / S.020 / pH: x  Gluc: x / Ketone: Trace  / Bili: Negative / Urobili: Negative   Blood: x / Protein: 100 / Nitrite: Negative   Leuk Esterase: Negative / RBC: 2-5 /HPF / WBC 0-2 /HPF   Sq Epi: x / Non Sq Epi: Few /HPF / Bacteria: Few /HPF      Culture Results:   <10,000 CFU/mL Normal Urogenital Solange ( @ 01:12)  Culture Results:   No growth to date. ( @ 00:53)  Culture Results:   No growth to date. ( @ 00:53)      RADIOLOGY & ADDITIONAL STUDIES REVIEWED:  ***    [ ]GOALS OF CARE DISCUSSION WITH PATIENT/FAMILY/PROXY:    CRITICAL CARE TIME SPENT: 35 minutes

## 2020-03-23 NOTE — CHART NOTE - NSCHARTNOTEFT_GEN_A_CORE
Asked to evaluate pt for NSTEMI.    Pt is a 64 yr old male with no sig PMHX admitted with COVID 19 infection, Septic shock, Respiratory failure, Pneumonia, Acute renal failure and NSTEMI Type II due to demand ischemia. D/W Dr. Pruitt, cont ABX, vent and pressor support as per ICU. Currently department of Cardiology is not performing Echocardiogram on COVID infected patients unless would  ie transfer for ECMO.

## 2020-03-23 NOTE — PROGRESS NOTE ADULT - ASSESSMENT
Assessment  - Acute Hypoxic Respiratory Failure - intubated   - CAP - On Rocephin, zithromax  - R/O COVID - on Plaquenil, Vitamin C, Thiamine - approval done by Dr. scales    Neuro  - sedated. On versed, propofol infusion    Cardio  Hypotension  - Pt initially on Pheny but pt still hypotensive,  Pt now on Levophed  - Monitor BP  - Elevated Trop - most likely due to demand ischemia, T3- 12- t4 9.4   - Cardiac consult     Resp  Acute Hypoxic Respiratory Failure  - COVID +   - 2/2 respiratory distress, resolved w/ intubation and sedation  - Likely 2/2 viral induced ARDS; C/w MV and sedation PRN  - serial ABG, Daily Xray    GI  - GI stress ulcer PPx w/ PPI; placing NGT, start trickle feeds    Renal  - pw normal Cr - now Creatinine increasing to 3.8 today   - noted to hyperkalemic to 5.9 - will give lokelma, insulin with d5 and albuterol tx   - fu repeat bmp  - Placed FC, strict IOs  - i/o ->net approx + 2000-   - renal consult Dr. Zuniga       Endo  - Goal Glc < 180    Heme  - No acute issues; DVT PPx w/ Lovenox     ID   Sepsis  - 2/2 COVID versus multifocal PNA  - C/w CAP ABx  - F/u BCx/  - + COVID    PPX:   lovenox and PPI for ppx

## 2020-03-23 NOTE — CONSULT NOTE ADULT - ATTENDING COMMENTS
[facility] NEPHROLOGY  Pardeep Rodriguez M.D.  Harjit Emery D.O.  Lula Zuniga M.D.  Mary Koo, MSN, ANP-C  [phone]    [address] [place] [address]

## 2020-03-23 NOTE — DIETITIAN INITIAL EVALUATION ADULT. - PERTINENT LABORATORY DATA
03-23 Na144 mmol/L Glu 130 mg/dL<H> K+ 5.4 mmol/L<H> Cr  4.83 mg/dL<H> BUN 39 mg/dL<H> 03-23 Phos 7.2 mg/dL<H> 03-23 Alb 1.9 g/dL<L> 03-22 [id number] 6.5 %<H> 03-22 Chol 108 mg/dL LDL 52 mg/dL HDL 37 mg/dL<L> Trig 95 mg/dL

## 2020-03-23 NOTE — DIETITIAN INITIAL EVALUATION ADULT. - PERTINENT MEDS FT
MEDICATIONS  (STANDING):  aspirin  chewable 81 milliGRAM(s) Oral daily  azithromycin  IVPB 500 milliGRAM(s) IV Intermittent every 24 hours  cefTRIAXone   IVPB 1000 milliGRAM(s) IV Intermittent every 24 hours  chlorhexidine 0.12% Liquid 15 milliLiter(s) Oral Mucosa every 12 hours  chlorhexidine 2% Cloths 1 Application(s) Topical daily  enoxaparin Injectable 40 milliGRAM(s) SubCutaneous daily  HYDROmorphone Infusion 1.5 mG/Hr (1.5 mL/Hr) IV Continuous <Continuous>  hydroxychloroquine 200 milliGRAM(s) Oral two times a day  norepinephrine Infusion 0.05 MICROgram(s)/kG/Min (3.4 mL/Hr) IV Continuous <Continuous>  pantoprazole    Tablet 40 milliGRAM(s) Oral before breakfast  propofol Infusion 10 MICROgram(s)/kG/Min (4.36 mL/Hr) IV Continuous <Continuous>  sodium bicarbonate 325 milliGRAM(s) Oral three times a day  thiamine IVPB 500 milliGRAM(s) IV Intermittent daily    MEDICATIONS  (PRN):  acetaminophen    Suspension .. 650 milliGRAM(s) Oral every 6 hours PRN Temp greater or equal to 38.5C (101.3F)  ALBUTerol    90 MICROgram(s) HFA Inhaler 2 Puff(s) Inhalation every 6 hours PRN Shortness of Breath and/or Wheezing  guaiFENesin   Syrup  (Sugar-Free) 100 milliGRAM(s) Oral every 6 hours PRN Cough  sodium chloride 0.9% lock flush 10 milliLiter(s) IV Push every 1 hour PRN Pre/post blood products, medications, blood draw, and to maintain line patency

## 2020-03-24 NOTE — PROCEDURE NOTE - NSINDICATIONS_GEN_A_CORE
airway protection
critical patient/respiratory failure
monitoring purposes
dialysis/CRRT
volume resuscitation

## 2020-03-24 NOTE — PROCEDURE NOTE - NSINFORMCONSENT_GEN_A_CORE
This was an emergent procedure.
Benefits, risks, and possible complications of procedure explained to patient/caregiver who verbalized understanding and gave verbal consent.
This was an emergent procedure.

## 2020-03-24 NOTE — CHART NOTE - NSCHARTNOTEFT_GEN_A_CORE
PC  received a telephone call from the patient's daughter Cristiana Marti [phone] stating she and her sisters discussed the primary NOKS and they decided Cristiana would be the point of contact for the medical team.  PC  explained Cristiana can expect to receive a daily update and needs to disseminate the information to her family as the team can not receive and make multiple calls to multiple family members each day.  In addition, PC  re-educated Cristiana regarding the role of the NOKS.  No addl. needs were expressed.  PC  contacted the evening nurse mgr. on [address] and relayed this information.  PC  will remain available as needed.

## 2020-03-24 NOTE — PROGRESS NOTE ADULT - ASSESSMENT
Assessment  - Acute Hypoxic Respiratory Failure - intubated   - CAP - On Rocephin, zithromax  - R/O COVID - on Plaquenil, Vitamin C, Thiamine - approval done by Dr. scales    Neuro  - sedated. On dilaudid, propofol infusion    Cardio  Hypotension  - Pt now on Levophed  - Monitor BP  - Elevated Trop - most likely due to demand ischemia, T3- 12- t4 9.4   - cardio recommends supportive care as  - Cardiac consult Dr. Beard     Resp  Acute Hypoxic Respiratory Failure  - COVID +   - 2/2 respiratory distress, resolved w/ intubation and sedation  - Likely 2/2 viral induced ARDS; C/w MV and sedation  GI  - GI stress ulcer PPx w/ PPI; NGT, TF started     Renal  - pw normal Cr - now Creatinine increasing to 3.8->4.8-> 6.1 -> likely ATN in the setting of hypotension   - noted to be hyperkalemic to 5.5 - nephrology recommended lokelma 10 mg tid x48 hrs and bicarb infusion for mets acidosis   - Placed FC, strict IOs  - i/o ->net approx + 1200  - pt will start new HD after getting shiley cath 3/24  - renal consult Dr. Zuniga       Endo  - Goal Glc < 180    Heme  - No acute issues; DVT PPx w/ Lovenox     ID   Sepsis  - 2/2 COVID versus multifocal PNA  - C/w CAP ABx  - NGTD on BCx/  - + COVID    PPX:   lovenox and PPI for ppx Assessment  - Acute Hypoxic Respiratory Failure - intubated   - CAP - On Rocephin, zithromax  - R/O COVID - on Plaquenil, Vitamin C, Thiamine - approval done by Dr. scales    Neuro  - sedated. On dilaudid, propofol infusion    Cardio  Hypotension  - Pt now on Levophed  - Monitor BP  - Elevated Trop - most likely due to demand ischemia, T3- 12- t4 9.4   - cardio recommends supportive care as  - Cardiac consult Dr. Beard     Resp  Acute Hypoxic Respiratory Failure  - COVID +   - 2/2 respiratory distress, resolved w/ intubation and sedation  - Likely 2/2 viral induced ARDS; C/w MV and sedation  GI  - GI stress ulcer PPx w/ PPI; NGT, TF started     Renal  - pw normal Cr - now Creatinine increasing to 3.8->4.8-> 6.1 -> likely ATN in the setting of hypotension   - noted to be hyperkalemic to 5.5 - nephrology recommended lokelma 10 mg tid x48 hrs and bicarb infusion for mets acidosis   - Placed FC, strict IOs  - i/o ->net approx + 1200  - pt will start new HD after getting shiley cath 3/24  - renal consult Dr. Zuniga       Endo  - Goal Glc < 180    Heme  - No acute issues; DVT PPx w/ Lovenox     ID   Sepsis  - 2/2 COVID versus multifocal PNA  - C/w CAP ABx  - NGTD on BCx/  - + COVID    PPX:   heparin and PPI for ppx

## 2020-03-24 NOTE — CHART NOTE - NSCHARTNOTEFT_GEN_A_CORE
attempted to contact patient's daughter Rochelle [phone] to determine NOKS, however her voicemail was full.   then contacted patient's daughter Sivakumar [phone] to discuss NOKS.  KEENAN educated Sivakumar regarding this process and she verbalized understanding. As per Sivakumar, the patient is not , does not have a significant other, has one son in [place] from whom he is estranged with no contact information, Rochelle who is a half sibling and Sivakumar has three sisters.  Pc Keenan inquired about a HCP, however was informed the patient never completed such a document.  Sw requested the adult daughters discuss and determine who the primary person of contact will be and provide this information to this . Furthermore, this  explained the staff can contact one family member once per day who will in turn share the medical information with the remainder of the family.  sivakumar verbalized understanding and agreed with this plan stating she will contact Rochelle and her other three sisters to have this discussion.  As per Sivakumar, the patients' daughters have a good relationship and remain in contact.    provided her contact information and stated she will await Sivakumar' s call.

## 2020-03-24 NOTE — PROGRESS NOTE ADULT - SUBJECTIVE AND OBJECTIVE BOX
INTERVAL HPI/OVERNIGHT EVENTS: remains sedated and intubated, continued on pressors overnight, very low urine output   PRESSORS: [x] YES [ ] NO  WHICH: levophed        Antimicrobial:  azithromycin  IVPB 500 milliGRAM(s) IV Intermittent every 24 hours  cefTRIAXone   IVPB 1000 milliGRAM(s) IV Intermittent every 24 hours  hydroxychloroquine 200 milliGRAM(s) Oral two times a day    Cardiovascular:  norepinephrine Infusion 0.05 MICROgram(s)/kG/Min IV Continuous <Continuous>    Pulmonary:  ALBUTerol    90 MICROgram(s) HFA Inhaler 2 Puff(s) Inhalation every 6 hours PRN  guaiFENesin   Syrup  (Sugar-Free) 100 milliGRAM(s) Oral every 6 hours PRN    Hematalogic:  aspirin  chewable 81 milliGRAM(s) Oral daily  enoxaparin Injectable 40 milliGRAM(s) SubCutaneous daily    Other:  acetaminophen    Suspension .. 650 milliGRAM(s) Oral every 6 hours PRN  chlorhexidine 0.12% Liquid 15 milliLiter(s) Oral Mucosa every 12 hours  chlorhexidine 2% Cloths 1 Application(s) Topical daily  HYDROmorphone Infusion 1.5 mG/Hr IV Continuous <Continuous>  pantoprazole    Tablet 40 milliGRAM(s) Oral before breakfast  propofol Infusion 10 MICROgram(s)/kG/Min IV Continuous <Continuous>  sodium bicarbonate 325 milliGRAM(s) Oral three times a day  sodium chloride 0.9% lock flush 10 milliLiter(s) IV Push every 1 hour PRN  thiamine IVPB 500 milliGRAM(s) IV Intermittent daily    acetaminophen    Suspension .. 650 milliGRAM(s) Oral every 6 hours PRN  ALBUTerol    90 MICROgram(s) HFA Inhaler 2 Puff(s) Inhalation every 6 hours PRN  aspirin  chewable 81 milliGRAM(s) Oral daily  azithromycin  IVPB 500 milliGRAM(s) IV Intermittent every 24 hours  cefTRIAXone   IVPB 1000 milliGRAM(s) IV Intermittent every 24 hours  chlorhexidine 0.12% Liquid 15 milliLiter(s) Oral Mucosa every 12 hours  chlorhexidine 2% Cloths 1 Application(s) Topical daily  enoxaparin Injectable 40 milliGRAM(s) SubCutaneous daily  guaiFENesin   Syrup  (Sugar-Free) 100 milliGRAM(s) Oral every 6 hours PRN  HYDROmorphone Infusion 1.5 mG/Hr IV Continuous <Continuous>  hydroxychloroquine 200 milliGRAM(s) Oral two times a day  norepinephrine Infusion 0.05 MICROgram(s)/kG/Min IV Continuous <Continuous>  pantoprazole    Tablet 40 milliGRAM(s) Oral before breakfast  propofol Infusion 10 MICROgram(s)/kG/Min IV Continuous <Continuous>  sodium bicarbonate 325 milliGRAM(s) Oral three times a day  sodium chloride 0.9% lock flush 10 milliLiter(s) IV Push every 1 hour PRN  thiamine IVPB 500 milliGRAM(s) IV Intermittent daily    Drug Dosing Weight  Height (cm): 172.72 (21 Mar 2020 14:43)  Weight (kg): 72.6 (21 Mar 2020 14:43)  BMI (kg/m2): 24.3 (21 Mar 2020 14:43)  BSA (m2): 1.86 (21 Mar 2020 14:43)    CENTRAL LINE: [ x] YES [ ] NO  LOCATION: [facility]  DATE INSERTED:3.22  REMOVE: [ ] YES [ ] NO  EXPLAIN:    ARANGO: [x] YES [ ] NO    DATE INSERTED:  REMOVE:  [ ] YES [ ] NO  EXPLAIN:    A-LINE:  [ X] YES [ ] NO  LOCATION:   DATE INSERTED:3.22  REMOVE:  [ ] YES [ ] NO  EXPLAIN:      ICU Vital Signs Last 24 Hrs  T(C): 36.9 (24 Mar 2020 05:15), Max: 37.4 (23 Mar 2020 17:00)  T(F): 98.5 (24 Mar 2020 05:15), Max: 99.4 (23 Mar 2020 17:00)  HR: 75 (24 Mar 2020 09:00) (75 - 94)  BP: 128/73 (24 Mar 2020 09:00) (74/51 - 128/73)  BP(mean): 81 (24 Mar 2020 06:45) (55 - 82)  ABP: 118/60 (24 Mar 2020 09:00) (57/34 - 168/77)  ABP(mean): 74 (24 Mar 2020 06:45) (22 - 92)  RR: 32 (24 Mar 2020 09:00) (32 - 32)  SpO2: 100% (24 Mar 2020 09:00) (91% - 100%)      ABG - ( 24 Mar 2020 04:17 )  pH, Arterial: 7.24  pH, Blood: x     /  pCO2: 40    /  pO2: 214   / HCO3: 16    / Base Excess: -10.7 /  SaO2: 99                    03-23 @ 07:01  -  03-24 @ 07:00  --------------------------------------------------------  IN: 2272.1 mL / OUT: 40 mL / NET: 2232.1 mL        Mode: AC/ CMV (Assist Control/ Continuous Mandatory Ventilation)  RR (machine): 32  TV (machine): 400  FiO2: 60  PEEP: 14  ITime: 1  MAP: 20  PIP: 32      >>> <<<    PHYSICAL EXAM:    GENERAL: sedated and intubated   HEAD: atraumatic, normocephalic   ENMT: nasal mucosa- Oral cavity- dry  NECK: supple, JVD/ no JVD, thyroid-   SKIN: warm, dry   CHEST/LUNG: intubated. + rhonchi., no wheezing   HEART: RRR, no m/r/g   ABDOMEN: soft, nontender, nondistended; bowel sounds.  : arango catheter.  EXTREMITIES: +1 non-pitting edema, no cyanosis, no clubbing.  NEURO: sedated     LABS:  CBC Full  -  ( 24 Mar 2020 06:27 )  WBC Count : 9.79 K/uL  RBC Count : 3.66 M/uL  Hemoglobin : 11.5 g/dL  Hematocrit : 37.8 %  Platelet Count - Automated : 196 K/uL  Mean Cell Volume : 103.3 fl  Mean Cell Hemoglobin : 31.4 pg  Mean Cell Hemoglobin Concentration : 30.4 gm/dL  Auto Neutrophil # : x  Auto Lymphocyte # : x  Auto Monocyte # : x  Auto Eosinophil # : x  Auto Basophil # : x  Auto Neutrophil % : x  Auto Lymphocyte % : x  Auto Monocyte % : x  Auto Eosinophil % : x  Auto Basophil % : x    03-23    144  |  116<H>  |  39<H>  ----------------------------<  130<H>  5.4<H>   |  18<L>  |  4.83<H>    Ca    6.6<L>      23 Mar 2020 15:46  Phos  6.3     03-24  Mg     2.4     03-24    TPro  6.3  /  Alb  1.9<L>  /  TBili  0.4  /  DBili  x   /  AST  5550<H>  /  ALT  2807<H>  /  AlkPhos  175<H>  03-23    PT/INR - ( 23 Mar 2020 15:46 )   PT: 18.7 sec;   INR: 1.63 ratio         PTT - ( 23 Mar 2020 15:46 )  PTT:34.9 sec    Culture Results:   <10,000 CFU/mL Normal Urogenital Solange (03-22 @ 01:12)  Culture Results:   No growth to date. (03-22 @ 00:53)  Culture Results:   No growth to date. (03-22 @ 00:53)      RADIOLOGY & ADDITIONAL STUDIES REVIEWED:  ***    [ ]GOALS OF CARE DISCUSSION WITH PATIENT/FAMILY/PROXY:    CRITICAL CARE TIME SPENT: 35 minutes

## 2020-03-24 NOTE — PROGRESS NOTE ADULT - SUBJECTIVE AND OBJECTIVE BOX
[facility] NEPHROLOGY- PROGRESS NOTE    Patient is a 63yo Male with no PMH h/o neck surgery in the past p/w fever and cough x 1 week. With no improvement despite PO antibiotics. CXR with multifocal PNA. Pt developed severe rigors, pt became tachypneic/ destating with signs of cyanosis s/p intubation for acute hypoxic respiratory failure. Pt admitted to MICU for Septic Shock 2/2 multifocal PNA due to +COVID-19 and Hypotension on pressors. Pt developing worsening renal failure.   Nephrology consulted for Elevated serum creatinine.    Hospital Medications: Medications reviewed.  REVIEW OF SYSTEMS: Unable to obtain    VITALS:  T(F): 99.6 (20 @ 12:45), Max: 99.6 (20 @ 12:45)  HR: 83 (20 @ 16:35)  BP: 101/59 (20 @ 15:45)  RR: 32 (20 @ 15:45)  SpO2: 98% (20 @ 16:35)  Wt(kg): --  Height (cm): 172.72 ( @ 14:43)  Weight (kg): 72.6 ( @ 14:43)  BMI (kg/m2): 24.3 ( @ 14:43)  BSA (m2): 1.86 ( @ 14:43)    0323 @ 07:01  -   @ 07:00  --------------------------------------------------------  IN: 2272.1 mL / OUT: 40 mL / NET: 2232.1 mL      Defer PHYSICAL EXAM due to COVID-19 status  As per ICU resident exam today:     GENERAL: sedated and intubated   HEAD: atraumatic, normocephalic   ENMT: nasal mucosa- Oral cavity- dry  NECK: supple, JVD/ no JVD, thyroid-   SKIN: warm, dry   CHEST/LUNG: intubated. + rhonchi., no wheezing   HEART: RRR, no m/r/g   ABDOMEN: soft, nontender, nondistended; bowel sounds.  : arango catheter.  EXTREMITIES: +1 non-pitting edema, no cyanosis, no clubbing.  NEURO: sedated     HD Access: Rt femoral non tunneled HD catheter    LABS:      143  |  115<H>  |  47<H>  ----------------------------<  125<H>  5.5<H>   |  13<L>  |  6.21<H>    Ca    6.7<L>      24 Mar 2020 06:27  Phos  6.3       Mg     2.4         TPro  5.9<L>  /  Alb  1.6<L>  /  TBili  0.3  /  DBili      /  AST  see note  /  ALT  see note  /  AlkPhos  183<H>      Creatinine Trend: 6.21 <--, 4.83 <--, 3.80 <--, 1.77 <--, 1.06 <--                        11.5   9.79  )-----------( 196      ( 24 Mar 2020 06:27 )             37.8     Urine Studies:  Urinalysis Basic - ( 21 Mar 2020 17:34 )    Color: Yellow / Appearance: Clear / S.020 / pH:   Gluc:  / Ketone: Trace  / Bili: Negative / Urobili: Negative   Blood:  / Protein: 100 / Nitrite: Negative   Leuk Esterase: Negative / RBC: 2-5 /HPF / WBC 0-2 /HPF   Sq Epi:  / Non Sq Epi: Few /HPF / Bacteria: Few /HPF      Creatinine, Random Urine: 209 mg/dL ( @ 10:49)  Sodium, Random Urine: 39 mmol/L ( @ 10:49)  Osmolality, Random Urine: 324 mos/kg ( @ 10:49)    RADIOLOGY & ADDITIONAL STUDIES:

## 2020-03-24 NOTE — PROGRESS NOTE ADULT - ASSESSMENT
Patient is a 65yo Male with no PMH h/o neck surgery in the past p/w fever and cough x 1 week. With no improvement despite PO antibiotics. CXR with multifocal PNA. Pt developed severe rigors, pt became tachypneic/ destating with signs of cyanosis s/p intubation for acute hypoxic respiratory failure. Pt admitted to MICU for Septic Shock 2/2 multifocal PNA due to +COVID-19 and Hypotension on pressors. Pt developing worsening renal failure.   Nephrology consulted for Elevated serum creatinine.    1. TYLER- SCr 1.06 on admission. Anuric TYLER in the setting of septic shock 2/2 COVID-19/ hypotension.  FeNa 0.5%; Unable to perform TTE at this time as per Cards.  Patient remains anuric with worsening renal function; will initiate HD today 3/24. Neg HepBsAg. HD consent from pt's daughter, Rochelle Bruno in chart (verbal consent; witnessed)   Check Renal US.  Strict I/Os. Avoid nephrotoxins/ NSAIDs/ RCA. Monitor BMP.  2. Septic Shock 2/2 Multifocal PNA in the setting of COVID-19- Pt on abx/ vent support with Hydroxychloroquine/ Thiamine as per ICU  3. Hypotension- Pressors as per ICU  4. Acute hypoxic respiratory failure- vent support as per ICU.   5. Hyperkalemia- in the setting of renal failure. Will d/c bicarb tabs. Change TF to Nepro. Should improve with HD; can hold further Lokelma. Monitor serum K.

## 2020-03-24 NOTE — PROCEDURE NOTE - NSPOSTCAREGUIDE_GEN_A_CORE
Care for catheter as per unit/ICU protocols
Care for catheter as per unit/ICU protocols
Verbal/written post procedure instructions were given to patient/caregiver/Instructed patient/caregiver regarding signs and symptoms of infection/Instructed patient/caregiver to follow-up with primary care physician/Keep the cast/splint/dressing clean and dry/Care for catheter as per unit/ICU protocols

## 2020-03-25 NOTE — PROGRESS NOTE ADULT - ASSESSMENT
Patient is a 65yo Male with no PMH h/o neck surgery in the past p/w fever and cough x 1 week. With no improvement despite PO antibiotics. CXR with multifocal PNA. Pt developed severe rigors, pt became tachypneic/ desaturating with signs of cyanosis s/p intubation for acute hypoxic respiratory failure. Pt admitted to MICU for Septic Shock 2/2 multifocal PNA       due to +COVID-19 and Hypotension on pressors. Pt developing worsening renal failure.

## 2020-03-25 NOTE — PROGRESS NOTE ADULT - PROBLEM SELECTOR PLAN 6
DVT and GI prophylaxis.  Continue thiamine and Vitamin C for total of 5 days.  Condition guarded.  Family updated on condition.

## 2020-03-25 NOTE — PROGRESS NOTE ADULT - ASSESSMENT
Patient is a 65yo Male with no PMH h/o neck surgery in the past p/w fever and cough x 1 week. With no improvement despite PO antibiotics. CXR with multifocal PNA. Pt developed severe rigors, pt became tachypneic/ destating with signs of cyanosis s/p intubation for acute hypoxic respiratory failure. Pt admitted to MICU for Septic Shock 2/2 multifocal PNA due to +COVID-19 and Hypotension on pressors. Pt developing worsening renal failure.   Nephrology consulted for Elevated serum creatinine.    1. TYLER- SCr 1.06 on admission. Anuric TYLER in the setting of septic shock 2/2 COVID-19/ hypotension.  FeNa 0.5%; Unable to perform TTE at this time as per Cards.  Patient remains anuric with worsening renal function; initiated on HD 3/24. s/p HD 3/24, tolerated well with 1.5L removed. Pt remains anuric; will plan for 2nd HD today.   Check Renal US.  Strict I/Os. Avoid nephrotoxins/ NSAIDs/ RCA. Monitor BMP.  2. Septic Shock 2/2 Multifocal PNA in the setting of COVID-19- Pt on abx/ vent support with Hydroxychloroquine/ Thiamine as per ICU  3. Hypotension- Pressors as per ICU  4. Acute hypoxic respiratory failure- vent support as per ICU.   5. Hyperkalemia- in the setting of renal failure. Resolved with HD. If persistent, consider changing TF to Nepro. Monitor serum K.

## 2020-03-25 NOTE — PROGRESS NOTE ADULT - SUBJECTIVE AND OBJECTIVE BOX
[facility] NEPHROLOGY- PROGRESS NOTE    Patient is a 65yo Male with no PMH h/o neck surgery in the past p/w fever and cough x 1 week. With no improvement despite PO antibiotics. CXR with multifocal PNA. Pt developed severe rigors, pt became tachypneic/ destating with signs of cyanosis s/p intubation for acute hypoxic respiratory failure. Pt admitted to MICU for Septic Shock 2/2 multifocal PNA due to +COVID-19 and Hypotension on pressors. Pt developing worsening renal failure.   Nephrology consulted for Elevated serum creatinine.    Hospital Medications: Medications reviewed.  REVIEW OF SYSTEMS: Unable to obtain    VITALS:  T(F): 98.8 (20 @ 07:30), Max: 100.3 (20 @ 05:00)  HR: 77 (20 @ 13:00)  BP: 120/50 (20 @ 06:00)  RR: 32 (20 @ 13:00)  SpO2: 95% (20 @ 13:00)  Wt(kg): --     @ 07:01  -   @ 07:00  --------------------------------------------------------  IN: 1796.3 mL / OUT: 2015 mL / NET: -218.7 mL     @ 07:01  -   @ 14:32  --------------------------------------------------------  IN: 322.2 mL / OUT: 5 mL / NET: 317.2 mL      Defer PHYSICAL EXAM due to COVID-19 status  Will refer to ICU NP note for PE      HD Access: Rt femoral non tunneled HD catheter    LABS:      140  |  108  |  55<H>  ----------------------------<  123<H>  5.2   |  16<L>  |  7.30<H>    Ca    7.0<L>      25 Mar 2020 05:48  Phos  6.3     03-24  Mg     2.3     -25    TPro  6.1  /  Alb  1.6<L>  /  TBili  0.5  /  DBili      /  AST  829<H>  /  ALT  1648<H>  /  AlkPhos  188<H>      Creatinine Trend: 7.30 <--, 6.21 <--, 4.83 <--, 3.80 <--, 1.77 <--, 1.06 <--                        11.7   9.35  )-----------( 214      ( 25 Mar 2020 05:48 )             36.9     Urine Studies:  Urinalysis Basic - ( 21 Mar 2020 17:34 )    Color: Yellow / Appearance: Clear / S.020 / pH:   Gluc:  / Ketone: Trace  / Bili: Negative / Urobili: Negative   Blood:  / Protein: 100 / Nitrite: Negative   Leuk Esterase: Negative / RBC: 2-5 /HPF / WBC 0-2 /HPF   Sq Epi:  / Non Sq Epi: Few /HPF / Bacteria: Few /HPF      Creatinine, Random Urine: 209 mg/dL ( @ 10:49)  Sodium, Random Urine: 39 mmol/L ( @ 10:49)  Osmolality, Random Urine: 324 mos/kg ( @ 10:49)

## 2020-03-25 NOTE — PROGRESS NOTE ADULT - SUBJECTIVE AND OBJECTIVE BOX
DNR [ ]   DNI  [  ]   FULL CODE    INTERVAL HPI/OVERNIGHT EVENTS: ***First HD session yesterday.    PRESSORS: [x ] YES [ ] NO  WHICH: Norepinephrine    ANTIBIOTICS:                  DATE STARTED:  ANTIBIOTICS:                  DATE STARTED:  ANTIBIOTICS:                  DATE STARTED:    azithromycin  IVPB 500 milliGRAM(s) IV Intermittent every 24 hours  cefTRIAXone   IVPB 1000 milliGRAM(s) IV Intermittent every 24 hours  hydroxychloroquine 200 milliGRAM(s) Oral two times a day    Cardiovascular:  Heart Failure  Acute   Acute on Chronic  Chronic       norepinephrine Infusion 0.05 MICROgram(s)/kG/Min IV Continuous <Continuous>    Pulmonary:  ALBUTerol    90 MICROgram(s) HFA Inhaler 2 Puff(s) Inhalation every 6 hours PRN  guaiFENesin   Syrup  (Sugar-Free) 100 milliGRAM(s) Oral every 6 hours PRN    Hematalogic:  aspirin  chewable 81 milliGRAM(s) Oral daily  heparin  Injectable 5000 Unit(s) SubCutaneous every 8 hours    Other:  acetaminophen    Suspension .. 650 milliGRAM(s) Oral every 6 hours PRN  chlorhexidine 0.12% Liquid 15 milliLiter(s) Oral Mucosa every 12 hours  chlorhexidine 2% Cloths 1 Application(s) Topical daily  HYDROmorphone Infusion 1.5 mG/Hr IV Continuous <Continuous>  mupirocin 2% Nasal 1 Application(s) Nasal two times a day  pantoprazole    Tablet 40 milliGRAM(s) Oral before breakfast  propofol Infusion 10 MICROgram(s)/kG/Min IV Continuous <Continuous>  sodium chloride 0.9% lock flush 10 milliLiter(s) IV Push every 1 hour PRN  thiamine IVPB 500 milliGRAM(s) IV Intermittent daily    acetaminophen    Suspension .. 650 milliGRAM(s) Oral every 6 hours PRN  ALBUTerol    90 MICROgram(s) HFA Inhaler 2 Puff(s) Inhalation every 6 hours PRN  aspirin  chewable 81 milliGRAM(s) Oral daily  azithromycin  IVPB 500 milliGRAM(s) IV Intermittent every 24 hours  cefTRIAXone   IVPB 1000 milliGRAM(s) IV Intermittent every 24 hours  chlorhexidine 0.12% Liquid 15 milliLiter(s) Oral Mucosa every 12 hours  chlorhexidine 2% Cloths 1 Application(s) Topical daily  guaiFENesin   Syrup  (Sugar-Free) 100 milliGRAM(s) Oral every 6 hours PRN  heparin  Injectable 5000 Unit(s) SubCutaneous every 8 hours  HYDROmorphone Infusion 1.5 mG/Hr IV Continuous <Continuous>  hydroxychloroquine 200 milliGRAM(s) Oral two times a day  mupirocin 2% Nasal 1 Application(s) Nasal two times a day  norepinephrine Infusion 0.05 MICROgram(s)/kG/Min IV Continuous <Continuous>  pantoprazole    Tablet 40 milliGRAM(s) Oral before breakfast  propofol Infusion 10 MICROgram(s)/kG/Min IV Continuous <Continuous>  sodium chloride 0.9% lock flush 10 milliLiter(s) IV Push every 1 hour PRN  thiamine IVPB 500 milliGRAM(s) IV Intermittent daily    Drug Dosing Weight  Height (cm): 172.72 (21 Mar 2020 14:43)  Weight (kg): 72.6 (21 Mar 2020 14:43)  BMI (kg/m2): 24.3 (21 Mar 2020 14:43)  BSA (m2): 1.86 (21 Mar 2020 14:43)    CENTRAL LINE: [x ] YES [ ] NO  LOCATION:  [facility] 3/22, Right femoral shiley 3/24    REMOVE: [ ] YES [x ] NO  EXPLAIN: Critically ill patient.    MAHMOOD: [x ] YES [ ] NO    DATE INSERTED:  REMOVE:  [ ] YES [x ] NO  EXPLAIN:  Critically ill    A-LINE:  [x ] YES [ ] NO  LOCATION:   DATE INSERTED:  REMOVE:  [ ] YES [x ] NO  EXPLAIN:  Critically ill      PAST MEDICAL & SURGICAL HISTORY:  No pertinent past medical history  No significant past surgical history        ABG - ( 25 Mar 2020 04:28 )  pH, Arterial: 7.28  pH, Blood: x     /  pCO2: 34    /  pO2: 110   / HCO3: 15    / Base Excess: -10.2 /  SaO2: 97                    03-24 @ 07:01  -  03-25 @ 07:00  --------------------------------------------------------  IN: 1796.3 mL / OUT: 2015 mL / NET: -218.7 mL        Mode: AC/ CMV (Assist Control/ Continuous Mandatory Ventilation)  RR (machine): 32  TV (machine): 400  FiO2: 40  PEEP: 8  ITime: 1  MAP: 14  PIP: 24      PHYSICAL EXAM:    GENERAL: NAD  HEAD:  Atraumatic, Normocephalic  EYES: EOMI, PERRLA, conjunctiva and sclera clear  ENMT: No tonsillar erythema, exudates. Nasal NGT, orally intubted  NECK: Supple, No JVD, RIJ  NERVOUS SYSTEM: Sedated  CHEST/LUNG: Clear to percussion bilaterally; No rales, rhonchi, wheezing, or rubs  HEART: Regular rate and rhythm; No murmurs, rubs, or gallops  ABDOMEN: Soft, Nontender, Nondistended; Bowel sounds present  EXTREMITIES:  2+ Peripheral Pulses, No clubbing, cyanosis, or edema  LYMPH: No lymphadenopathy noted  SKIN: No rashes or lesions      LABS:  CBC Full  -  ( 25 Mar 2020 05:48 )  WBC Count : 9.35 K/uL  RBC Count : 3.62 M/uL  Hemoglobin : 11.7 g/dL  Hematocrit : 36.9 %  Platelet Count - Automated : 214 K/uL  Mean Cell Volume : 101.9 fl  Mean Cell Hemoglobin : 32.3 pg  Mean Cell Hemoglobin Concentration : 31.7 gm/dL  Auto Neutrophil # : 8.00 K/uL  Auto Lymphocyte # : 0.82 K/uL  Auto Monocyte # : 0.31 K/uL  Auto Eosinophil # : 0.08 K/uL  Auto Basophil # : 0.01 K/uL  Auto Neutrophil % : 85.5 %  Auto Lymphocyte % : 8.8 %  Auto Monocyte % : 3.3 %  Auto Eosinophil % : 0.9 %  Auto Basophil % : 0.1 %    03-25    140  |  108  |  55<H>  ----------------------------<  123<H>  5.2   |  16<L>  |  7.30<H>    Ca    7.0<L>      25 Mar 2020 05:48  Phos  6.3     03-24  Mg     2.3     03-25    TPro  6.1  /  Alb  1.6<L>  /  TBili  0.5  /  DBili  x   /  AST  829<H>  /  ALT  1648<H>  /  AlkPhos  188<H>  03-25    PT/INR - ( 23 Mar 2020 15:46 )   PT: 18.7 sec;   INR: 1.63 ratio         PTT - ( 23 Mar 2020 15:46 )  PTT:34.9 sec          [  ]  DVT Prophylaxis  [  ]  Nutrition, Brand, Rate         Goal Rate         Abdominal Nutritional Status -  Malnutrition   Cachexia      Morbid Obesity BMI >/=40    RADIOLOGY & ADDITIONAL STUDIES:  ***    [  ] Goals of Care Discussion with Family/Proxy/Other           Elements of Conversation Discussed: Patient/Family understanding of current illness   Advanced Directives                                                                       Prognosis  Treatment Options  Care Aligned with patient's wishes                                             TIME SPENT: 35 minutes DNR [ ]   DNI  [  ]   FULL CODE    INTERVAL HPI/OVERNIGHT EVENTS: ***First HD session yesterday.    PRESSORS: [x ] YES [ ] NO  WHICH: Norepinephrine    ANTIBIOTICS:                  DATE STARTED:  ANTIBIOTICS:                  DATE STARTED:  ANTIBIOTICS:                  DATE STARTED:    azithromycin  IVPB 500 milliGRAM(s) IV Intermittent every 24 hours  cefTRIAXone   IVPB 1000 milliGRAM(s) IV Intermittent every 24 hours  hydroxychloroquine 200 milliGRAM(s) Oral two times a day    Cardiovascular:  Heart Failure  Acute   Acute on Chronic  Chronic       norepinephrine Infusion 0.05 MICROgram(s)/kG/Min IV Continuous <Continuous>    Pulmonary:  ALBUTerol    90 MICROgram(s) HFA Inhaler 2 Puff(s) Inhalation every 6 hours PRN  guaiFENesin   Syrup  (Sugar-Free) 100 milliGRAM(s) Oral every 6 hours PRN    Hematalogic:  aspirin  chewable 81 milliGRAM(s) Oral daily  heparin  Injectable 5000 Unit(s) SubCutaneous every 8 hours    Other:  acetaminophen    Suspension .. 650 milliGRAM(s) Oral every 6 hours PRN  chlorhexidine 0.12% Liquid 15 milliLiter(s) Oral Mucosa every 12 hours  chlorhexidine 2% Cloths 1 Application(s) Topical daily  HYDROmorphone Infusion 1.5 mG/Hr IV Continuous <Continuous>  mupirocin 2% Nasal 1 Application(s) Nasal two times a day  pantoprazole    Tablet 40 milliGRAM(s) Oral before breakfast  propofol Infusion 10 MICROgram(s)/kG/Min IV Continuous <Continuous>  sodium chloride 0.9% lock flush 10 milliLiter(s) IV Push every 1 hour PRN  thiamine IVPB 500 milliGRAM(s) IV Intermittent daily    acetaminophen    Suspension .. 650 milliGRAM(s) Oral every 6 hours PRN  ALBUTerol    90 MICROgram(s) HFA Inhaler 2 Puff(s) Inhalation every 6 hours PRN  aspirin  chewable 81 milliGRAM(s) Oral daily  azithromycin  IVPB 500 milliGRAM(s) IV Intermittent every 24 hours  cefTRIAXone   IVPB 1000 milliGRAM(s) IV Intermittent every 24 hours  chlorhexidine 0.12% Liquid 15 milliLiter(s) Oral Mucosa every 12 hours  chlorhexidine 2% Cloths 1 Application(s) Topical daily  guaiFENesin   Syrup  (Sugar-Free) 100 milliGRAM(s) Oral every 6 hours PRN  heparin  Injectable 5000 Unit(s) SubCutaneous every 8 hours  HYDROmorphone Infusion 1.5 mG/Hr IV Continuous <Continuous>  hydroxychloroquine 200 milliGRAM(s) Oral two times a day  mupirocin 2% Nasal 1 Application(s) Nasal two times a day  norepinephrine Infusion 0.05 MICROgram(s)/kG/Min IV Continuous <Continuous>  pantoprazole    Tablet 40 milliGRAM(s) Oral before breakfast  propofol Infusion 10 MICROgram(s)/kG/Min IV Continuous <Continuous>  sodium chloride 0.9% lock flush 10 milliLiter(s) IV Push every 1 hour PRN  thiamine IVPB 500 milliGRAM(s) IV Intermittent daily    Drug Dosing Weight  Height (cm): 172.72 (21 Mar 2020 14:43)  Weight (kg): 72.6 (21 Mar 2020 14:43)  BMI (kg/m2): 24.3 (21 Mar 2020 14:43)  BSA (m2): 1.86 (21 Mar 2020 14:43)    CENTRAL LINE: [x ] YES [ ] NO  LOCATION:  [facility] 3/22, Right femoral shiley 3/24    REMOVE: [ ] YES [x ] NO  EXPLAIN: Critically ill patient.    MAHMOOD: [x ] YES [ ] NO    DATE INSERTED:  REMOVE:  [ ] YES [x ] NO  EXPLAIN:  Critically ill    A-LINE:  [x ] YES [ ] NO  LOCATION:   DATE INSERTED:  REMOVE:  [ ] YES [x ] NO  EXPLAIN:  Critically ill      PAST MEDICAL & SURGICAL HISTORY:  No pertinent past medical history  No significant past surgical history        ABG - ( 25 Mar 2020 04:28 )  pH, Arterial: 7.28  pH, Blood: x     /  pCO2: 34    /  pO2: 110   / HCO3: 15    / Base Excess: -10.2 /  SaO2: 97                    03-24 @ 07:01  -  03-25 @ 07:00  --------------------------------------------------------  IN: 1796.3 mL / OUT: 2015 mL / NET: -218.7 mL        Mode: AC/ CMV (Assist Control/ Continuous Mandatory Ventilation)  RR (machine): 32  TV (machine): 400  FiO2: 40  PEEP: 8  ITime: 1  MAP: 14  PIP: 24      PHYSICAL EXAM:    GENERAL: NAD  HEAD:  Atraumatic, Normocephalic  EYES: EOMI, PERRLA, conjunctiva and sclera clear  ENMT: No tonsillar erythema, exudates. Nasal NGT, orally intubted  NECK: Supple, No JVD, RIJ  NERVOUS SYSTEM: Sedated  CHEST/LUNG: Diminished breath sounds bilaterally  HEART: Regular rate and rhythm; No murmurs, rubs, or gallops  ABDOMEN: Soft, Nontender, Nondistended; Bowel sounds present  EXTREMITIES:  Right femoral shiley; 2+ Peripheral Pulses, No clubbing, cyanosis, or edema  LYMPH: No lymphadenopathy noted  SKIN: No rashes or lesions      LABS:  CBC Full  -  ( 25 Mar 2020 05:48 )  WBC Count : 9.35 K/uL  RBC Count : 3.62 M/uL  Hemoglobin : 11.7 g/dL  Hematocrit : 36.9 %  Platelet Count - Automated : 214 K/uL  Mean Cell Volume : 101.9 fl  Mean Cell Hemoglobin : 32.3 pg  Mean Cell Hemoglobin Concentration : 31.7 gm/dL  Auto Neutrophil # : 8.00 K/uL  Auto Lymphocyte # : 0.82 K/uL  Auto Monocyte # : 0.31 K/uL  Auto Eosinophil # : 0.08 K/uL  Auto Basophil # : 0.01 K/uL  Auto Neutrophil % : 85.5 %  Auto Lymphocyte % : 8.8 %  Auto Monocyte % : 3.3 %  Auto Eosinophil % : 0.9 %  Auto Basophil % : 0.1 %    03-25    140  |  108  |  55<H>  ----------------------------<  123<H>  5.2   |  16<L>  |  7.30<H>    Ca    7.0<L>      25 Mar 2020 05:48  Phos  6.3     03-24  Mg     2.3     03-25    TPro  6.1  /  Alb  1.6<L>  /  TBili  0.5  /  DBili  x   /  AST  829<H>  /  ALT  1648<H>  /  AlkPhos  188<H>  03-25    PT/INR - ( 23 Mar 2020 15:46 )   PT: 18.7 sec;   INR: 1.63 ratio         PTT - ( 23 Mar 2020 15:46 )  PTT:34.9 sec          [  ]  DVT Prophylaxis  [  ]  Nutrition, Brand, Rate         Goal Rate         Abdominal Nutritional Status -  Malnutrition   Cachexia      Morbid Obesity BMI >/=40    RADIOLOGY & ADDITIONAL STUDIES:  ***  < from: Xray Chest 1 View- PORTABLE-Routine (03.24.20 @ 11:24) >  Findings: The patient is status post endotracheal tube placement in good position. There is an NG Tube with its tip projecting below the diaphragm. There is a right IJ central line present with its tip at the SVC.    There is an mild interval worsening of interstitial opacities more towards the lower lungs bilaterally. The heart size is normal. The visualized osseous structures are unremarkable.    IMPRESSION: Mild interval worsening of bilateral interstitial opacities more so towards the lung bases.    < end of copied text >    [  ] Goals of Care Discussion with Family/Proxy/Other           Elements of Conversation Discussed: Patient/Family understanding of current illness   Advanced Directives                                                                       Prognosis  Treatment Options  Care Aligned with patient's wishes                                             TIME SPENT: 35 minutes

## 2020-03-26 NOTE — PROGRESS NOTE ADULT - ASSESSMENT
Assessment  - Acute Hypoxic Respiratory Failure - intubated   - CAP - On Rocephin day 5/7, completed zithromax 5 days   - R/O COVID - on Plaquenil, Vitamin C, Thiamine - approval done by Dr. scales    Neuro  - sedated. On dilaudid, propofol infusion    Cardio  Hypotension  - Pt on Levophed  - Monitor BP  - Elevated Trop - most likely due to demand ischemia, troponins trended down   - cardio recommends supportive care as  - Cardiac consult Dr. Beard     Resp  Acute Hypoxic Respiratory Failure  - COVID +   - CAP - On Rocephin day 5/7, completed zithromax 5 days   - 2/2 respiratory distress, resolved w/ intubation and sedation  - Likely 2/2 viral induced ARDS; C/w MV and sedation    GI  - GI stress ulcer PPx w/ PPI; NGT, TF started     Renal  - pw normal Cr - now Creatinine increasing to 3.8->4.8-> 6.1 ->7. likely ATN in the setting of hypotension   - noted to be hyperkalemic to 5.5 - nephrology recommended lokelma 10 mg tid x48 hrs and bicarb infusion for mets acidosis   - Placed FC, strict IOs  - i/o ->net approx + 4k   - started on new HD 3/24-> last HD 3/25 -WILL go for HD 3/27  - renal consult Dr. Zuniga       Endo  - Goal Glc < 180    Heme  - No acute issues; DVT PPx w/ Lovenox     ID   Sepsis  - 2/2 COVID versus multifocal PNA  - C/w CAP ABx rocephin day 5/7, completed zithro  - NGTD on BCx/  - + COVID    PPX:   heparin and PPI for ppx

## 2020-03-26 NOTE — CONSULT NOTE ADULT - SUBJECTIVE AND OBJECTIVE BOX
HPI:  64 year old male from home lives with wife  ambulates independently with no significant PMHx and has PSHx of neck surgery  presents to the ED with complaints of fever x 1 week. Patient states fevers come and go and are associated with cough, productive of white phlegm. Patient seen at an urgent care recently and started on doxycycline, Tylenol, cough medications, and albuterol. Patient however reports symptoms have not improved with these medications. Denies chest pain, shortness of breath, abdominal pain, nausea, vomiting, sick contacts, recent travel or any other acute complaints. Denies smoking, alcohol, illicit drug use. NKDA     In ED :   s/p 2.3 L NS bolus   RR 36   Flu -ve   UA -ve   Lactate 1.3   ABG : WNL   CXR : Showing B/L Lower lobes infiltrate and opacities    Pt is FULL CODE. (21 Mar 2020 20:11)      PAST MEDICAL & SURGICAL HISTORY:  No pertinent past medical history  No significant past surgical history      SOCIAL HISTORY:    Admitted from:  home assisted living [facility]   Substance abuse history:              Tobacco hx:                  Alcohol hx:              Home Opioid hx:  Scientology:                                    Preferred Language:    Surrogate/HCP/Guardian:            Phone#:    FAMILY HISTORY:    Baseline ADLs (prior to admission):    Allergies    No Known Allergies    Intolerances      Present Symptoms:   Dyspnea:   Nausea/Vomiting:   Anxiety:  Depressed   Fatigue:  Loss of appetite:   Pain:                                location:          Review of Systems: [All others negative or Unable to obtain due to poor mentation]    MEDICATIONS  (STANDING):  aspirin  chewable 81 milliGRAM(s) Oral daily  cefTRIAXone   IVPB 1000 milliGRAM(s) IV Intermittent every 24 hours  chlorhexidine 0.12% Liquid 15 milliLiter(s) Oral Mucosa every 12 hours  chlorhexidine 2% Cloths 1 Application(s) Topical daily  heparin  Injectable 5000 Unit(s) SubCutaneous every 8 hours  HYDROmorphone Infusion 1.5 mG/Hr (1.5 mL/Hr) IV Continuous <Continuous>  hydroxychloroquine 200 milliGRAM(s) Oral two times a day  mupirocin 2% Nasal 1 Application(s) Nasal two times a day  norepinephrine Infusion 0.05 MICROgram(s)/kG/Min (3.4 mL/Hr) IV Continuous <Continuous>  pantoprazole   Suspension 40 milliGRAM(s) Oral daily  propofol Infusion 10 MICROgram(s)/kG/Min (4.36 mL/Hr) IV Continuous <Continuous>  sodium zirconium cyclosilicate 10 Gram(s) Oral once  thiamine IVPB 500 milliGRAM(s) IV Intermittent daily    MEDICATIONS  (PRN):  acetaminophen    Suspension .. 650 milliGRAM(s) Oral every 6 hours PRN Temp greater or equal to 38.5C (101.3F)  ALBUTerol    90 MICROgram(s) HFA Inhaler 2 Puff(s) Inhalation every 6 hours PRN Shortness of Breath and/or Wheezing  guaiFENesin   Syrup  (Sugar-Free) 100 milliGRAM(s) Oral every 6 hours PRN Cough  sodium chloride 0.9% lock flush 10 milliLiter(s) IV Push every 1 hour PRN Pre/post blood products, medications, blood draw, and to maintain line patency      PHYSICAL EXAM:    Vital Signs Last 24 Hrs  T(C): 36.8 (26 Mar 2020 08:00), Max: 37.3 (25 Mar 2020 17:00)  T(F): 98.3 (26 Mar 2020 08:00), Max: 99.2 (25 Mar 2020 19:30)  HR: 89 (26 Mar 2020 13:45) (79 - 92)  BP: 93/48 (26 Mar 2020 12:00) (91/63 - 138/75)  BP(mean): 82 (26 Mar 2020 10:45) (64 - 91)  RR: 28 (26 Mar 2020 13:30) (17 - 29)  SpO2: 95% (26 Mar 2020 13:30) (82% - 97%)    General: alert  oriented x ____    [lethargic distressed cachexia  nonverbal  unarousable verbal]  Karnofsky Performance Score/Palliative Performance Status Version2:     %    HEENT: normal  dry mouth  ET tube/trach oral lesions:  Lungs: comfortable tachypnea/labored breathing  excessive secretions  CV: normal  tachycardia  GI: normal  distended  tender  incontinent               PEG/NG/OG tube  constipation  last BM:   : normal  incontinent  oliguria/anuria  arango  Musculoskeletal: normal  weakness  edema             ambulatory  bedbound/wheelchair bound  Skin: normal  pressure ulcers: stage: edema: other:  Neuro: no deficits cognitive impairment dsyphagia/dysarthria paresis: other:  Oral intake ability: unable/only mouth care [minimal moderate full capability]  Diet: [NPO]    LABS:                        12.8   10.27 )-----------( 215      ( 26 Mar 2020 06:29 )             39.0     03-26    136  |  100  |  57<H>  ----------------------------<  145<H>  5.4<H>   |  16<L>  |  7.56<H>    Ca    6.7<L>      26 Mar 2020 06:29  Mg     2.5     03-26    TPro  6.3  /  Alb  1.5<L>  /  TBili  0.6  /  DBili  x   /  AST  321<H>  /  ALT  1042<H>  /  AlkPhos  184<H>  03-26        RADIOLOGY & ADDITIONAL STUDIES:    ADVANCE DIRECTIVES:   Advanced Care Planning discussion total time spent: HPI:  64 year old male from home lives with wife  ambulates independently with no significant PMHx and has PSHx of neck surgery presented to the ED with complaints of fever x 1 week. Patient reported fevers come and go and are associated with cough, productive of white phlegm. Patient was seen at an urgent care recently and started on doxycycline, Tylenol, cough medications, and albuterol. Patient reported no improvement in symptoms; denies chest pain, shortness of breath, abdominal pain, nausea, vomiting, sick contacts, recent travel or any other acute complaints. Denies smoking, alcohol, illicit drug use. NKDA     CXR : Showing B/L Lower lobes infiltrate and opacities          PAST MEDICAL & SURGICAL HISTORY:  No pertinent past medical history  No significant past surgical history      SOCIAL HISTORY:    Admitted from:  home assisted living [facility]   Substance abuse history:              Tobacco hx:                  Alcohol hx:              Home Opioid hx:  Moravian:                                    Preferred Language:    Surrogate/HCP/Guardian:            Phone#:    FAMILY HISTORY:    Baseline ADLs (prior to admission):    Allergies    No Known Allergies    Intolerances      Present Symptoms:   Dyspnea:   Nausea/Vomiting:   Anxiety:  Depressed   Fatigue:  Loss of appetite:   Pain:                                location:          Review of Systems: [All others negative or Unable to obtain due to poor mentation]    MEDICATIONS  (STANDING):  aspirin  chewable 81 milliGRAM(s) Oral daily  cefTRIAXone   IVPB 1000 milliGRAM(s) IV Intermittent every 24 hours  chlorhexidine 0.12% Liquid 15 milliLiter(s) Oral Mucosa every 12 hours  chlorhexidine 2% Cloths 1 Application(s) Topical daily  heparin  Injectable 5000 Unit(s) SubCutaneous every 8 hours  HYDROmorphone Infusion 1.5 mG/Hr (1.5 mL/Hr) IV Continuous <Continuous>  hydroxychloroquine 200 milliGRAM(s) Oral two times a day  mupirocin 2% Nasal 1 Application(s) Nasal two times a day  norepinephrine Infusion 0.05 MICROgram(s)/kG/Min (3.4 mL/Hr) IV Continuous <Continuous>  pantoprazole   Suspension 40 milliGRAM(s) Oral daily  propofol Infusion 10 MICROgram(s)/kG/Min (4.36 mL/Hr) IV Continuous <Continuous>  sodium zirconium cyclosilicate 10 Gram(s) Oral once  thiamine IVPB 500 milliGRAM(s) IV Intermittent daily    MEDICATIONS  (PRN):  acetaminophen    Suspension .. 650 milliGRAM(s) Oral every 6 hours PRN Temp greater or equal to 38.5C (101.3F)  ALBUTerol    90 MICROgram(s) HFA Inhaler 2 Puff(s) Inhalation every 6 hours PRN Shortness of Breath and/or Wheezing  guaiFENesin   Syrup  (Sugar-Free) 100 milliGRAM(s) Oral every 6 hours PRN Cough  sodium chloride 0.9% lock flush 10 milliLiter(s) IV Push every 1 hour PRN Pre/post blood products, medications, blood draw, and to maintain line patency      PHYSICAL EXAM:    Vital Signs Last 24 Hrs  T(C): 36.8 (26 Mar 2020 08:00), Max: 37.3 (25 Mar 2020 17:00)  T(F): 98.3 (26 Mar 2020 08:00), Max: 99.2 (25 Mar 2020 19:30)  HR: 89 (26 Mar 2020 13:45) (79 - 92)  BP: 93/48 (26 Mar 2020 12:00) (91/63 - 138/75)  BP(mean): 82 (26 Mar 2020 10:45) (64 - 91)  RR: 28 (26 Mar 2020 13:30) (17 - 29)  SpO2: 95% (26 Mar 2020 13:30) (82% - 97%)    General: alert  oriented x ____    [lethargic distressed cachexia  nonverbal  unarousable verbal]  Karnofsky Performance Score/Palliative Performance Status Version2:     %    HEENT: normal  dry mouth  ET tube/trach oral lesions:  Lungs: comfortable tachypnea/labored breathing  excessive secretions  CV: normal  tachycardia  GI: normal  distended  tender  incontinent               PEG/NG/OG tube  constipation  last BM:   : normal  incontinent  oliguria/anuria  arango  Musculoskeletal: normal  weakness  edema             ambulatory  bedbound/wheelchair bound  Skin: normal  pressure ulcers: stage: edema: other:  Neuro: no deficits cognitive impairment dsyphagia/dysarthria paresis: other:  Oral intake ability: unable/only mouth care [minimal moderate full capability]  Diet: [NPO]    LABS:                        12.8   10.27 )-----------( 215      ( 26 Mar 2020 06:29 )             39.0     03-26    136  |  100  |  57<H>  ----------------------------<  145<H>  5.4<H>   |  16<L>  |  7.56<H>    Ca    6.7<L>      26 Mar 2020 06:29  Mg     2.5     03-26    TPro  6.3  /  Alb  1.5<L>  /  TBili  0.6  /  DBili  x   /  AST  321<H>  /  ALT  1042<H>  /  AlkPhos  184<H>  03-26        RADIOLOGY & ADDITIONAL STUDIES:    ADVANCE DIRECTIVES:   Advanced Care Planning discussion total time spent: HPI:  64 year old male from home ambulates independently with no significant PMHx and has PSHx of neck surgery presented to the ED with complaints of fever x 1 week. Patient reported fevers come and go and are associated with cough, productive of white phlegm. Patient was seen at an urgent care recently and started on doxycycline, Tylenol, cough medications, and albuterol. Patient reported no improvement in symptoms; denies chest pain, shortness of breath, abdominal pain, nausea, vomiting, sick contacts, recent travel or any other acute complaints. Denies smoking, alcohol, illicit drug use. [facility]    Hospital Course:  CXR showing B/L lower lobes infiltrate and opacities - admitted for concern of COVID, on arrival to N noted w/ abnormal vitals along w/ severe rigors. Patient was given Tylenol IV and showed improvement however patient had recurrence of rigors now a/w severe acute hypoxic respiratory failure w/ evidence of peripheral cyanosis - decision was then made to intubate the patient emergently w/ anesthesia. Patient seen in ICU - remains sedated/intubated, on pressor, IV abx, + COVID-19. Cr worsening despite 2 sessions of HD; plan for HD 3/27.     PAST MEDICAL & SURGICAL HISTORY:  No pertinent past medical history  No significant past surgical history      SOCIAL HISTORY:    Admitted from:  home, not   Substance abuse history / Tobacco hx /  Alcohol hx / Home Opioid hx: Unable to obtain - patient sedated/intubated.  Jehovah's witness:    Jewish                                Preferred Language: French    Surrogate/HCP/Guardian:  Cristianajohnathan Marti (dtr/surrogate): [phone]    FAMILY HISTORY: Unable to obtain - patient sedated/intubated.    Baseline ADLs (prior to admission): alert/oriented, ambulatory, independent of ADLs    No Known Allergies    Present Symptoms:          Review of Systems: Unable to obtain - patient sedated/intubated.    MEDICATIONS  (STANDING):  aspirin  chewable 81 milliGRAM(s) Oral daily  cefTRIAXone   IVPB 1000 milliGRAM(s) IV Intermittent every 24 hours  chlorhexidine 0.12% Liquid 15 milliLiter(s) Oral Mucosa every 12 hours  chlorhexidine 2% Cloths 1 Application(s) Topical daily  heparin  Injectable 5000 Unit(s) SubCutaneous every 8 hours  HYDROmorphone Infusion 1.5 mG/Hr (1.5 mL/Hr) IV Continuous <Continuous>  hydroxychloroquine 200 milliGRAM(s) Oral two times a day  mupirocin 2% Nasal 1 Application(s) Nasal two times a day  norepinephrine Infusion 0.05 MICROgram(s)/kG/Min (3.4 mL/Hr) IV Continuous <Continuous>  pantoprazole   Suspension 40 milliGRAM(s) Oral daily  propofol Infusion 10 MICROgram(s)/kG/Min (4.36 mL/Hr) IV Continuous <Continuous>  sodium zirconium cyclosilicate 10 Gram(s) Oral once  thiamine IVPB 500 milliGRAM(s) IV Intermittent daily    MEDICATIONS  (PRN):  acetaminophen    Suspension .. 650 milliGRAM(s) Oral every 6 hours PRN Temp greater or equal to 38.5C (101.3F)  ALBUTerol    90 MICROgram(s) HFA Inhaler 2 Puff(s) Inhalation every 6 hours PRN Shortness of Breath and/or Wheezing  guaiFENesin   Syrup  (Sugar-Free) 100 milliGRAM(s) Oral every 6 hours PRN Cough  sodium chloride 0.9% lock flush 10 milliLiter(s) IV Push every 1 hour PRN Pre/post blood products, medications, blood draw, and to maintain line patency      PHYSICAL EXAM:    Vital Signs Last 24 Hrs  T(C): 36.8 (26 Mar 2020 08:00), Max: 37.3 (25 Mar 2020 17:00)  T(F): 98.3 (26 Mar 2020 08:00), Max: 99.2 (25 Mar 2020 19:30)  HR: 89 (26 Mar 2020 13:45) (79 - 92)  BP: 93/48 (26 Mar 2020 12:00) (91/63 - 138/75)  BP(mean): 82 (26 Mar 2020 10:45) (64 - 91)  RR: 28 (26 Mar 2020 13:30) (17 - 29)  SpO2: 95% (26 Mar 2020 13:30) (82% - 97%)    General:  Patient not medically stable for full physical exam. Patient sedated/intubated, + pressor.   Karnofsky Performance Score/Palliative Performance Status Version2:     20%    HEENT:   ET tube   Lungs: on ventilator, continues dilaudid, propofol  CV:  tachycardia, on pressor support  GI:    incontinent, NG tube  :    arango  Musculoskeletal: patient sedated/intubated; dependent on ADLs  Skin: unable to assess  Neuro: unable to assess. Patient sedated/intubated  Oral intake ability: unable/only mouth care   Diet: NPO; + TF via NG tube    LABS:                        12.8   10.27 )-----------( 215      ( 26 Mar 2020 06:29 )             39.0     03-26    136  |  100  |  57<H>  ----------------------------<  145<H>  5.4<H>   |  16<L>  |  7.56<H>    Ca    6.7<L>      26 Mar 2020 06:29  Mg     2.5     03-26    TPro  6.3  /  Alb  1.5<L>  /  TBili  0.6  /  DBili  x   /  AST  321<H>  /  ALT  1042<H>  /  AlkPhos  184<H>  03-26    Albumin: 1.5    RADIOLOGY & ADDITIONAL STUDIES:  < from: Xray Chest 1 View- PORTABLE-Routine (03.26.20 @ 10:05) >  EXAM:  XR CHEST PORTABLE ROUTINE 1V                        PROCEDURE DATE:  03/26/2020    INTERPRETATION:  AP semierect chest on March 26, 2020 9:45 AM. Patient requires intubation.  The heart is magnified by technique.  Endotracheal tube, nasogastric tube, right jugular line remain.  Persistent significant bilateral infiltrates are again noted and may have increased somewhat from March 24.    IMPRESSION: Advanced infiltrates are somewhat increased.  < end of copied text >      ADVANCE DIRECTIVES: Full code. HPI:  64 year old male from home ambulates independently with no significant PMHx and has PSHx of neck surgery presented to the ED with complaints of fever x 1 week. Patient reported fevers come and go and are associated with cough, productive of white phlegm. Patient was seen at an urgent care recently and started on doxycycline, Tylenol, cough medications, and albuterol. Patient reported no improvement in symptoms; denies chest pain, shortness of breath, abdominal pain, nausea, vomiting, sick contacts, recent travel or any other acute complaints. Denied smoking, alcohol, illicit drug use. [facility]    Hospital Course:  CXR showing B/L lower lobes infiltrate and opacities - admitted for concern of COVID, on arrival to N noted w/ abnormal vitals along w/ severe rigors. Patient was given Tylenol IV and showed improvement however patient had recurrence of rigors now a/w severe acute hypoxic respiratory failure w/ evidence of peripheral cyanosis - decision was then made to intubate the patient emergently w/ anesthesia. Patient seen in ICU - remains sedated/intubated, on pressor, IV abx, + COVID-19. Cr worsening despite 2 sessions of HD; plan for HD 3/27.     PAST MEDICAL & SURGICAL HISTORY:  No pertinent past medical history  No significant past surgical history      SOCIAL HISTORY:    Admitted from:  home, not   Substance abuse history / Tobacco hx /  Alcohol hx / Home Opioid hx: Unable to obtain - patient sedated/intubated.  Druze:    Sabianist                                Preferred Language: Bermudian    Surrogate/HCP/Guardian:  Cristiana Kaia (dtr/surrogate): [phone]    FAMILY HISTORY: Unable to obtain - patient sedated/intubated.    Baseline ADLs (prior to admission): alert/oriented, ambulatory, independent of ADLs    No Known Allergies    Present Symptoms:          Review of Systems: Unable to obtain - patient sedated/intubated.    MEDICATIONS  (STANDING):  aspirin  chewable 81 milliGRAM(s) Oral daily  cefTRIAXone   IVPB 1000 milliGRAM(s) IV Intermittent every 24 hours  chlorhexidine 0.12% Liquid 15 milliLiter(s) Oral Mucosa every 12 hours  chlorhexidine 2% Cloths 1 Application(s) Topical daily  heparin  Injectable 5000 Unit(s) SubCutaneous every 8 hours  HYDROmorphone Infusion 1.5 mG/Hr (1.5 mL/Hr) IV Continuous <Continuous>  hydroxychloroquine 200 milliGRAM(s) Oral two times a day  mupirocin 2% Nasal 1 Application(s) Nasal two times a day  norepinephrine Infusion 0.05 MICROgram(s)/kG/Min (3.4 mL/Hr) IV Continuous <Continuous>  pantoprazole   Suspension 40 milliGRAM(s) Oral daily  propofol Infusion 10 MICROgram(s)/kG/Min (4.36 mL/Hr) IV Continuous <Continuous>  sodium zirconium cyclosilicate 10 Gram(s) Oral once  thiamine IVPB 500 milliGRAM(s) IV Intermittent daily    MEDICATIONS  (PRN):  acetaminophen    Suspension .. 650 milliGRAM(s) Oral every 6 hours PRN Temp greater or equal to 38.5C (101.3F)  ALBUTerol    90 MICROgram(s) HFA Inhaler 2 Puff(s) Inhalation every 6 hours PRN Shortness of Breath and/or Wheezing  guaiFENesin   Syrup  (Sugar-Free) 100 milliGRAM(s) Oral every 6 hours PRN Cough  sodium chloride 0.9% lock flush 10 milliLiter(s) IV Push every 1 hour PRN Pre/post blood products, medications, blood draw, and to maintain line patency      PHYSICAL EXAM:    Vital Signs Last 24 Hrs  T(C): 36.8 (26 Mar 2020 08:00), Max: 37.3 (25 Mar 2020 17:00)  T(F): 98.3 (26 Mar 2020 08:00), Max: 99.2 (25 Mar 2020 19:30)  HR: 89 (26 Mar 2020 13:45) (79 - 92)  BP: 93/48 (26 Mar 2020 12:00) (91/63 - 138/75)  BP(mean): 82 (26 Mar 2020 10:45) (64 - 91)  RR: 28 (26 Mar 2020 13:30) (17 - 29)  SpO2: 95% (26 Mar 2020 13:30) (82% - 97%)    General:  Patient not medically stable for full physical exam. Patient sedated/intubated, + pressor.   Karnofsky Performance Score/Palliative Performance Status Version2:     20%    HEENT:   ET tube   Lungs: on ventilator, continues dilaudid, propofol  CV:  tachycardia, on pressor support  GI:    incontinent, NG tube  :    arango  Musculoskeletal: patient sedated/intubated; dependent on ADLs  Skin: unable to assess  Neuro: unable to assess. Patient sedated/intubated  Oral intake ability: unable/only mouth care   Diet: NPO; + TF via NG tube    LABS:                        12.8   10.27 )-----------( 215      ( 26 Mar 2020 06:29 )             39.0     03-26    136  |  100  |  57<H>  ----------------------------<  145<H>  5.4<H>   |  16<L>  |  7.56<H>    Ca    6.7<L>      26 Mar 2020 06:29  Mg     2.5     03-26    TPro  6.3  /  Alb  1.5<L>  /  TBili  0.6  /  DBili  x   /  AST  321<H>  /  ALT  1042<H>  /  AlkPhos  184<H>  03-26    Albumin: 1.5    RADIOLOGY & ADDITIONAL STUDIES:  < from: Xray Chest 1 View- PORTABLE-Routine (03.26.20 @ 10:05) >  EXAM:  XR CHEST PORTABLE ROUTINE 1V                        PROCEDURE DATE:  03/26/2020    INTERPRETATION:  AP semierect chest on March 26, 2020 9:45 AM. Patient requires intubation.  The heart is magnified by technique.  Endotracheal tube, nasogastric tube, right jugular line remain.  Persistent significant bilateral infiltrates are again noted and may have increased somewhat from March 24.    IMPRESSION: Advanced infiltrates are somewhat increased.  < end of copied text >      ADVANCE DIRECTIVES: Full code.

## 2020-03-26 NOTE — CONSULT NOTE ADULT - ATTENDING COMMENTS
64 Y M no signif PMH under ICU care for COVID19+ PNA with MOF, shock. High risk for mortality. Palliative following for ongoing goals of care discussions w family. Full code for now. Agree with H&P, plan as above, discussed w Palliative NP.

## 2020-03-26 NOTE — PROGRESS NOTE ADULT - REASON FOR ADMISSION
CAP and r/o COVID

## 2020-03-26 NOTE — CHART NOTE - NSCHARTNOTEFT_GEN_A_CORE
Assessment:   Patient is a 64y old  Male who presents with a chief complaint of CAP and r/o COVID (26 Mar 2020 14:38). Pt continues intubated. Covid-19+. MOF, s/p 2 HD. Propofol 21.8 ml/hr (if x 24 hrs=576 kcals).      Factors impacting intake: [ ] none [ ] nausea  [ ] vomiting [ ] diarrhea [ ] constipation  [ ]chewing problems [ ] swallowing issues  [x ] other: see above    Diet Prescription: Diet, NPO with Tube Feed:   Tube Feeding Modality: Nasogastric  Jevity 1.5 Isai  Continuous  Starting Tube Feed Rate {mL per Hour}: 10  Increase Tube Feed Rate by (mL): 0     Every hour  Until Goal Tube Feed Rate (mL per Hour): 10  Tube Feed Duration (in Hours): 24  Tube Feed Start Time: 09:00 (20 @ 08:15)    Intake: (trickle feeds) 3/22 order    Daily Height in cm: 172.72 (21 Mar 2020 14:43)      Daily Weight in k.2 (25 Mar 2020 14:01)  Weight in k.9 (25 Mar 2020 11:31)  Weight in k.6 (23 Mar 2020 18:16)    % Weight Change: I>O 1+ generalized edema    Pertinent Medications: MEDICATIONS  (STANDING):  aspirin  chewable 81 milliGRAM(s) Oral daily  cefTRIAXone   IVPB 1000 milliGRAM(s) IV Intermittent every 24 hours  chlorhexidine 0.12% Liquid 15 milliLiter(s) Oral Mucosa every 12 hours  chlorhexidine 2% Cloths 1 Application(s) Topical daily  heparin  Injectable 5000 Unit(s) SubCutaneous every 8 hours  HYDROmorphone Infusion 1.5 mG/Hr (1.5 mL/Hr) IV Continuous <Continuous>  hydroxychloroquine 200 milliGRAM(s) Oral two times a day  mupirocin 2% Nasal 1 Application(s) Nasal two times a day  norepinephrine Infusion 0.05 MICROgram(s)/kG/Min (3.4 mL/Hr) IV Continuous <Continuous>  pantoprazole   Suspension 40 milliGRAM(s) Oral daily  propofol Infusion 10 MICROgram(s)/kG/Min (4.36 mL/Hr) IV Continuous <Continuous>  sodium zirconium cyclosilicate 10 Gram(s) Oral once  thiamine IVPB 500 milliGRAM(s) IV Intermittent daily    MEDICATIONS  (PRN):  acetaminophen    Suspension .. 650 milliGRAM(s) Oral every 6 hours PRN Temp greater or equal to 38.5C (101.3F)  ALBUTerol    90 MICROgram(s) HFA Inhaler 2 Puff(s) Inhalation every 6 hours PRN Shortness of Breath and/or Wheezing  guaiFENesin   Syrup  (Sugar-Free) 100 milliGRAM(s) Oral every 6 hours PRN Cough  sodium chloride 0.9% lock flush 10 milliLiter(s) IV Push every 1 hour PRN Pre/post blood products, medications, blood draw, and to maintain line patency    Pertinent Labs:  Na136 mmol/L Glu 145 mg/dL<H> K+ 5.4 mmol/L<H> Cr  7.56 mg/dL<H> BUN 57 mg/dL<H>  Phos 6.3 mg/dL<H>  Alb 1.5 g/dL<L>  [id number] 6.5 %<H>  Chol 108 mg/dL LDL 52 mg/dL HDL 37 mg/dL<L> Trig 95 mg/dL     CAPILLARY BLOOD GLUCOSE      POCT Blood Glucose.: 122 mg/dL (26 Mar 2020 11:50)  POCT Blood Glucose.: 131 mg/dL (26 Mar 2020 05:01)    Skin:     Estimated Needs:   [x ] no change since previous assessment  [ ] recalculated:       Previous Nutrition Diagnosis:   [ ] Altered GI function  [ ]Inadequate Oral Intake [x ] Swallowing Difficulty   [ ] Altered nutrition related labs [ ] Increased Nutrient Needs [ ] Overweight/Obesity   [ ] Unintended Weight Loss [ ] Food & Nutrition Related Knowledge Deficit [ ] Malnutrition   [ ] Other:     Nutrition Diagnosis is [x ] ongoing  [ ] resolved [ ] not applicable     New Nutrition Diagnosis: [x ] PCM (moderate) related to acute critical illness as evidenced by <50% needs> 5 days, 1+ generalized edema.       Interventions:   Recommend  [ ] Change Diet To:  [ ] Nutrition Supplement  [x ] Nutrition Support: with Propofol@ 19.6 or 21.8: Jevity 1.5 25x24 + 1 Pkt Prosource TID (Jevity 1.5 600 ml, 900 kcals, 38 gm protein. 3 Prosource add 45 gm protein, 180 kcal). MD to monitor. RD available.         Monitoring and Evaluation:    [ x ] Tolerance to diet prescription [ x ] weights [ x ] labs[ x ] follow up per protocol  [ ] other:

## 2020-03-26 NOTE — PROGRESS NOTE ADULT - SUBJECTIVE AND OBJECTIVE BOX
[facility] NEPHROLOGY- PROGRESS NOTE    Patient is a 65yo Male with no PMH h/o neck surgery in the past p/w fever and cough x 1 week. With no improvement despite PO antibiotics. CXR with multifocal PNA. Pt developed severe rigors, pt became tachypneic/ destating with signs of cyanosis s/p intubation for acute hypoxic respiratory failure. Pt admitted to MICU for Septic Shock 2/2 multifocal PNA due to +COVID-19 and Hypotension on pressors. Pt developing worsening renal failure.   Nephrology consulted for Elevated serum creatinine.    Hospital Medications: Medications reviewed.  REVIEW OF SYSTEMS: Unable to obtain    VITALS:  T(F): 98.3 (20 @ 08:00), Max: 99.2 (20 @ 19:30)  HR: 92 (20 @ 12:00)  BP: 93/48 (20 @ 12:00)  RR: 28 (20 @ 12:00)  SpO2: 97% (20 @ 12:00)  Wt(kg): --     @ 07:01  -   @ 07:00  --------------------------------------------------------  IN: 2384.4 mL / OUT: 2577 mL / NET: -192.6 mL     @ 07:01  -   @ 12:30  --------------------------------------------------------  IN: 201.2 mL / OUT: 0 mL / NET: 201.2 mL        Defer PHYSICAL EXAM due to COVID-19 status  Will refer to ICU note for PE      HD Access: Rt femoral non tunneled HD catheter    LABS:      136  |  100  |  57<H>  ----------------------------<  145<H>  5.4<H>   |  16<L>  |  7.56<H>    Ca    6.7<L>      26 Mar 2020 06:29  Mg     2.5         TPro  6.3  /  Alb  1.5<L>  /  TBili  0.6  /  DBili      /  AST  321<H>  /  ALT  1042<H>  /  AlkPhos  184<H>      Creatinine Trend: 7.56 <--, 7.30 <--, 6.21 <--, 4.83 <--, 3.80 <--, 1.77 <--, 1.06 <--                        12.8   10.27 )-----------( 215      ( 26 Mar 2020 06:29 )             39.0     Urine Studies:  Urinalysis Basic - ( 21 Mar 2020 17:34 )    Color: Yellow / Appearance: Clear / S.020 / pH:   Gluc:  / Ketone: Trace  / Bili: Negative / Urobili: Negative   Blood:  / Protein: 100 / Nitrite: Negative   Leuk Esterase: Negative / RBC: 2-5 /HPF / WBC 0-2 /HPF   Sq Epi:  / Non Sq Epi: Few /HPF / Bacteria: Few /HPF      Creatinine, Random Urine: 209 mg/dL ( @ 10:49)  Sodium, Random Urine: 39 mmol/L ( @ 10:49)  Osmolality, Random Urine: 324 mos/kg ( @ 10:49)

## 2020-03-26 NOTE — CONSULT NOTE ADULT - PROBLEM SELECTOR RECOMMENDATION 5
Spoke with patient's daughter/Cristiana on the phone - updated clinical status. Aware of guarded prognosis. Discussed risks vs benefits of resuscitation. Daughter appears realistic, will continue to follow and update surrogate regarding medical status. At this time, patient remains a full code. All questions answered; supportive counseling provided.

## 2020-03-26 NOTE — CONSULT NOTE ADULT - SUBJECTIVE AND OBJECTIVE BOX
Time of visit:    CHIEF COMPLAINT: Patient is a 64y old  Male who presents with a chief complaint of CAP and r/o COVID (26 Mar 2020 14:38)      HPI:  64 year old male from home lives with wife  ambulates independently with no significant PMHx and has PSHx of neck surgery  presents to the ED with complaints of fever x 1 week. Patient states fevers come and go and are associated with cough, productive of white phlegm. Patient seen at an urgent care recently and started on doxycycline, Tylenol, cough medications, and albuterol. Patient however reports symptoms have not improved with these medications. Denies chest pain, shortness of breath, abdominal pain, nausea, vomiting, sick contacts, recent travel or any other acute complaints. Denies smoking, alcohol, illicit drug use. NKDA     In ED :   s/p 2.3 L NS bolus   RR 36   Flu -ve   UA -ve   Lactate 1.3   ABG : WNL   CXR : Showing B/L Lower lobes infiltrate and opacities    Pt is FULL CODE. (21 Mar 2020 20:11)   Patient seen and examined.     PAST MEDICAL & SURGICAL HISTORY:  No pertinent past medical history  No significant past surgical history      Allergies    No Known Allergies    Intolerances        MEDICATIONS  (STANDING):  aspirin  chewable 81 milliGRAM(s) Oral daily  cefTRIAXone   IVPB 1000 milliGRAM(s) IV Intermittent every 24 hours  chlorhexidine 0.12% Liquid 15 milliLiter(s) Oral Mucosa every 12 hours  chlorhexidine 2% Cloths 1 Application(s) Topical daily  heparin  Injectable 5000 Unit(s) SubCutaneous every 8 hours  HYDROmorphone Infusion 1.5 mG/Hr (1.5 mL/Hr) IV Continuous <Continuous>  hydroxychloroquine 200 milliGRAM(s) Oral two times a day  mupirocin 2% Nasal 1 Application(s) Nasal two times a day  norepinephrine Infusion 0.05 MICROgram(s)/kG/Min (3.4 mL/Hr) IV Continuous <Continuous>  pantoprazole   Suspension 40 milliGRAM(s) Oral daily  polyethylene glycol 3350 17 Gram(s) Oral daily  propofol Infusion 10 MICROgram(s)/kG/Min (4.36 mL/Hr) IV Continuous <Continuous>  thiamine IVPB 500 milliGRAM(s) IV Intermittent daily      MEDICATIONS  (PRN):  acetaminophen    Suspension .. 650 milliGRAM(s) Oral every 6 hours PRN Temp greater or equal to 38.5C (101.3F)  ALBUTerol    90 MICROgram(s) HFA Inhaler 2 Puff(s) Inhalation every 6 hours PRN Shortness of Breath and/or Wheezing  guaiFENesin   Syrup  (Sugar-Free) 100 milliGRAM(s) Oral every 6 hours PRN Cough  sodium chloride 0.9% lock flush 10 milliLiter(s) IV Push every 1 hour PRN Pre/post blood products, medications, blood draw, and to maintain line patency   Medications up to date at time of exam.    Medications up to date at time of exam.    FAMILY HISTORY:      SOCIAL HISTORY  Smoking History: [   ] smoking/smoke exposure, [   ] former smoker  Living Condition: [   ] apartment, [   ] private house  Work History:   Travel History: denies recent travel  Illicit Substance Use: denies  Alcohol Use: denies    REVIEW OF SYSTEMS:    CONSTITUTIONAL:  denies fevers, chills, sweats, weight loss    HEENT:  denies diplopia or blurred vision, sore throat or runny nose.    CARDIOVASCULAR:  denies pressure, squeezing, tightness, or heaviness about the chest; no palpitations.    RESPIRATORY:  denies SOB, cough, ACEVES, wheezing.    GASTROINTESTINAL:  denies abdominal pain, nausea, vomiting or diarrhea.    GENITOURINARY: denies dysuria, frequency or urgency.    NEUROLOGIC:  denies numbness, tingling, seizures or weakness.    PSYCHIATRIC:  denies disorder of thought or mood.    MSK: denies swelling, redness      PHYSICAL EXAMINATION:    GENERAL: The patient is a well-developed, well-nourished, in no apparent distress.     Vital Signs Last 24 Hrs  T(C): 37.7 (26 Mar 2020 20:00), Max: 38.7 (26 Mar 2020 18:30)  T(F): 99.8 (26 Mar 2020 20:00), Max: 101.7 (26 Mar 2020 18:30)  HR: 100 (26 Mar 2020 23:00) (81 - 100)  BP: 120/75 (26 Mar 2020 19:00) (91/63 - 138/75)  BP(mean): 82 (26 Mar 2020 10:45) (69 - 91)  RR: 24 (26 Mar 2020 23:00) (24 - 29)  SpO2: 96% (26 Mar 2020 23:00) (91% - 97%)  Mode: AC/ CMV (Assist Control/ Continuous Mandatory Ventilation)  RR (machine): 24  TV (machine): 400  FiO2: 70  PEEP: 10  ITime: 1  MAP: 13.9  PIP: 23   (if applicable)    Chest Tube (if applicable)    HEENT: Head is normocephalic and atraumatic. Extraocular muscles are intact. Mucous membranes are moist.     NECK: Supple, no palpable adenopathy.    LUNGS: Clear to auscultation, no wheezing, rales, or rhonchi.    HEART: Regular rate and rhythm without murmur.    ABDOMEN: Soft, nontender, and nondistended.  No hepatosplenomegaly is noted.    RENAL: No difficulty voiding, no pelvic pain    EXTREMITIES: Without any cyanosis, clubbing, rash, lesions or edema.    NEUROLOGIC: Awake, alert, oriented, grossly intact    SKIN: Warm, dry, good turgor.      LABS:                        12.8   10.27 )-----------( 215      ( 26 Mar 2020 06:29 )             39.0     03-26    136  |  100  |  57<H>  ----------------------------<  145<H>  5.4<H>   |  16<L>  |  7.56<H>    Ca    6.7<L>      26 Mar 2020 06:29  Mg     2.5     03-26    TPro  6.3  /  Alb  1.5<L>  /  TBili  0.6  /  DBili  x   /  AST  321<H>  /  ALT  1042<H>  /  AlkPhos  184<H>  03-26    PT/INR - ( 26 Mar 2020 06:29 )   PT: 14.7 sec;   INR: 1.29 ratio         PTT - ( 26 Mar 2020 06:29 )  PTT:29.9 sec    ABG - ( 26 Mar 2020 05:33 )  pH, Arterial: 7.24  pH, Blood: x     /  pCO2: 40    /  pO2: 83    / HCO3: 17    / Base Excess: -9.7  /  SaO2: 94                CARDIAC MARKERS ( 26 Mar 2020 06:29 )  x     / x     / 250 U/L / x     / x          D-Dimer Assay, Quantitative: 8250 ng/mL DDU (03-26-20 @ 06:29)            MICROBIOLOGY: (if applicable)    RADIOLOGY & ADDITIONAL STUDIES:  EKG:   CXR:  ECHO:    IMPRESSION: 64y Male PAST MEDICAL & SURGICAL HISTORY:  No pertinent past medical history  No significant past surgical history   p/w                   SUGGESTION:

## 2020-03-26 NOTE — PROGRESS NOTE ADULT - ASSESSMENT
Patient is a 65yo Male with no PMH h/o neck surgery in the past p/w fever and cough x 1 week. With no improvement despite PO antibiotics. CXR with multifocal PNA. Pt developed severe rigors, pt became tachypneic/ destating with signs of cyanosis s/p intubation for acute hypoxic respiratory failure. Pt admitted to MICU for Septic Shock 2/2 multifocal PNA due to +COVID-19 and Hypotension on pressors. Pt developing worsening renal failure.   Nephrology consulted for Elevated serum creatinine.    1. TYLER- SCr 1.06 on admission. Anuric TYLER in the setting of septic shock 2/2 COVID-19/ hypotension.  FeNa 0.5%; Unable to perform TTE at this time as per Cards.  Patient remains anuric with worsening renal function; initiated on HD 3/24. Last HD 3/25 (2nd HD), tolerated well with 2.1L removed. Pt remains anuric; will plan for 3rd HD Friday; 3/27.   Will defer Renal US due to COVID status. Strict I/Os. Avoid nephrotoxins/ NSAIDs/ RCA. Monitor BMP.  2. Septic Shock 2/2 Multifocal PNA in the setting of COVID-19- Pt on abx/ vent support with Hydroxychloroquine/ Thiamine as per ICU  3. Hypotension- Pressors as per ICU  4. Acute hypoxic respiratory failure- vent support as per ICU.   5. Hyperkalemia- in the setting of renal failure; mild. Recc to change tube feeds to Nepro. Consider Lokelma 10g via NGT daily prn. Monitor serum K.

## 2020-03-26 NOTE — CONSULT NOTE ADULT - PROBLEM SELECTOR RECOMMENDATION 2
2/2 PNA/COVID-19; comorbid MOF. CXR indicates advanced infiltrates somewhat increased. Patient remains sedated/intubated, on pressor support, IV abx, plaquenil, thiamine. Guarded prognosis. Patient is a full code.

## 2020-03-26 NOTE — CONSULT NOTE ADULT - PROBLEM SELECTOR RECOMMENDATION 4
At baseline, patient is alert/oriented, ambulatory, independent of ADLs. Now with +COVID-19 resulting in PNA, + MOF requiring intubation, pressor support, dialysis. Guarded prognosis.

## 2020-03-26 NOTE — PROGRESS NOTE ADULT - ATTENDING COMMENTS
[facility] NEPHROLOGY  Pardeep Rodriguez M.D.  Harjit Emery D.O.  Lula Zuniga M.D.  Mary Koo, MSN, ANP-C  [phone]    [address] [place] [address]
[facility] NEPHROLOGY  Pardeep Rodriguez M.D.  Harjit Emery D.O.  Lula Zuniga M.D.  Mary Koo, MSN, ANP-C  [phone]    [address] [place] [address]
[facility] NEPHROLOGY  Pardeep Rodriguez M.D.  Harjit Emery D.O.  Lula Zuniga M.D.  Mary Koo, MSN, ANP-C  [phone]    [address] [place] [address]
agree with assessment and plan. patient to receive shiley catheter today for hemodialysis.

## 2020-03-26 NOTE — CONSULT NOTE ADULT - PROBLEM SELECTOR RECOMMENDATION 9
2/2 PNA/COVID-19/shock; involving lungs (patient intubated, acute hypoxic respiratory failure, FiO2 60, PEEP 8), heart (on pressor), kidneys (BUN/Cr: 57/7.56, Cr worsening despite 2 sessions of HD; plan for HD 3/27), liver (Alk Phos: 184, AST: 321, ALT: 1042). CXR indicates advanced infiltrates somewhat increased. Patient remains sedated/intubated, on pressor support, IV abx, plaquenil, thiamine. Guarded prognosis. Daughter/Cristiana is surrogate; patient is a full code.

## 2020-03-26 NOTE — PROGRESS NOTE ADULT - SUBJECTIVE AND OBJECTIVE BOX
INTERVAL HPI/OVERNIGHT EVENTS: remains sedated, intubated and on pressor support     PRESSORS: [x ] YES [ ] NO  WHICH: levophed    Antimicrobial:  cefTRIAXone   IVPB 1000 milliGRAM(s) IV Intermittent every 24 hours started on 3/22- will continue for 2 additional days   hydroxychloroquine 200 milliGRAM(s) Oral two times a day    Cardiovascular:  norepinephrine Infusion 0.05 MICROgram(s)/kG/Min IV Continuous <Continuous>    Pulmonary:  ALBUTerol    90 MICROgram(s) HFA Inhaler 2 Puff(s) Inhalation every 6 hours PRN  guaiFENesin   Syrup  (Sugar-Free) 100 milliGRAM(s) Oral every 6 hours PRN    Hematalogic:  aspirin  chewable 81 milliGRAM(s) Oral daily  heparin  Injectable 5000 Unit(s) SubCutaneous every 8 hours    Other:  acetaminophen    Suspension .. 650 milliGRAM(s) Oral every 6 hours PRN  chlorhexidine 0.12% Liquid 15 milliLiter(s) Oral Mucosa every 12 hours  chlorhexidine 2% Cloths 1 Application(s) Topical daily  HYDROmorphone Infusion 1.5 mG/Hr IV Continuous <Continuous>  mupirocin 2% Nasal 1 Application(s) Nasal two times a day  pantoprazole   Suspension 40 milliGRAM(s) Oral daily  propofol Infusion 10 MICROgram(s)/kG/Min IV Continuous <Continuous>  sodium chloride 0.9% lock flush 10 milliLiter(s) IV Push every 1 hour PRN  sodium zirconium cyclosilicate 10 Gram(s) Oral once  thiamine IVPB 500 milliGRAM(s) IV Intermittent daily    acetaminophen    Suspension .. 650 milliGRAM(s) Oral every 6 hours PRN  ALBUTerol    90 MICROgram(s) HFA Inhaler 2 Puff(s) Inhalation every 6 hours PRN  aspirin  chewable 81 milliGRAM(s) Oral daily  cefTRIAXone   IVPB 1000 milliGRAM(s) IV Intermittent every 24 hours  chlorhexidine 0.12% Liquid 15 milliLiter(s) Oral Mucosa every 12 hours  chlorhexidine 2% Cloths 1 Application(s) Topical daily  guaiFENesin   Syrup  (Sugar-Free) 100 milliGRAM(s) Oral every 6 hours PRN  heparin  Injectable 5000 Unit(s) SubCutaneous every 8 hours  HYDROmorphone Infusion 1.5 mG/Hr IV Continuous <Continuous>  hydroxychloroquine 200 milliGRAM(s) Oral two times a day  mupirocin 2% Nasal 1 Application(s) Nasal two times a day  norepinephrine Infusion 0.05 MICROgram(s)/kG/Min IV Continuous <Continuous>  pantoprazole   Suspension 40 milliGRAM(s) Oral daily  propofol Infusion 10 MICROgram(s)/kG/Min IV Continuous <Continuous>  sodium chloride 0.9% lock flush 10 milliLiter(s) IV Push every 1 hour PRN  sodium zirconium cyclosilicate 10 Gram(s) Oral once  thiamine IVPB 500 milliGRAM(s) IV Intermittent daily    Drug Dosing Weight  Height (cm): 172.72 (21 Mar 2020 14:43)  Weight (kg): 72.6 (21 Mar 2020 14:43)  BMI (kg/m2): 24.3 (21 Mar 2020 14:43)  BSA (m2): 1.86 (21 Mar 2020 14:43)    CENTRAL LINE: [ x] YES [ ] NO  LOCATION: [facility]  DATE INSERTED:3.22  REMOVE: [ ] YES [ ] NO  EXPLAIN:    ARANGO: [x] YES [ ] NO    DATE INSERTED:  REMOVE:  [ ] YES [ ] NO  EXPLAIN:    A-LINE:  [ X] YES [ ] NO  LOCATION:   DATE INSERTED:3.22  REMOVE:  [ ] YES [ ] NO  EXPLAIN:      ICU Vital Signs Last 24 Hrs  T(C): 36.8 (26 Mar 2020 08:00), Max: 37.3 (25 Mar 2020 17:00)  T(F): 98.3 (26 Mar 2020 08:00), Max: 99.2 (25 Mar 2020 19:30)  HR: 86 (26 Mar 2020 11:15) (76 - 89)  BP: 103/54 (26 Mar 2020 11:15) (91/63 - 138/75)  BP(mean): 82 (26 Mar 2020 10:45) (64 - 91)  ABP: 103/54 (26 Mar 2020 10:45) (79/47 - 138/53)  ABP(mean): 69 (26 Mar 2020 10:45) (58 - 85)  RR: 28 (26 Mar 2020 11:15) (17 - 32)  SpO2: 93% (26 Mar 2020 11:15) (82% - 97%)      ABG - ( 26 Mar 2020 05:33 )  pH, Arterial: 7.24  pH, Blood: x     /  pCO2: 40    /  pO2: 83    / HCO3: 17    / Base Excess: -9.7  /  SaO2: 94                    03-25 @ 07:01  -  03-26 @ 07:00  --------------------------------------------------------  IN: 2384.4 mL / OUT: 2577 mL / NET: -192.6 mL        Mode: AC/ CMV (Assist Control/ Continuous Mandatory Ventilation)  RR (machine): 28  TV (machine): 400  FiO2: 40  PEEP: 8  ITime: 1  MAP: 14  PIP: 24      >>> <<<    PHYSICAL EXAM:    GENERAL: sedated and intubated   HEAD: atraumatic, normocephalic   ENMT: nasal mucosa- Oral cavity- dry  NECK: supple, JVD/ no JVD, thyroid-   SKIN: warm, dry   CHEST/LUNG: intubated. + rhonchi., no wheezing   HEART: RRR, no m/r/g   ABDOMEN: soft, nontender, nondistended; bowel sounds.  : arango catheter.  EXTREMITIES: +1 non-pitting edema, no cyanosis, no clubbing.  NEURO: sedated     LABS:  CBC Full  -  ( 26 Mar 2020 06:29 )  WBC Count : 10.27 K/uL  RBC Count : 3.99 M/uL  Hemoglobin : 12.8 g/dL  Hematocrit : 39.0 %  Platelet Count - Automated : 215 K/uL  Mean Cell Volume : 97.7 fl  Mean Cell Hemoglobin : 32.1 pg  Mean Cell Hemoglobin Concentration : 32.8 gm/dL  Auto Neutrophil # : x  Auto Lymphocyte # : x  Auto Monocyte # : x  Auto Eosinophil # : x  Auto Basophil # : x  Auto Neutrophil % : x  Auto Lymphocyte % : x  Auto Monocyte % : x  Auto Eosinophil % : x  Auto Basophil % : x    03-26    136  |  100  |  57<H>  ----------------------------<  145<H>  5.4<H>   |  16<L>  |  7.56<H>    Ca    6.7<L>      26 Mar 2020 06:29  Mg     2.5     03-26    TPro  6.3  /  Alb  1.5<L>  /  TBili  0.6  /  DBili  x   /  AST  321<H>  /  ALT  1042<H>  /  AlkPhos  184<H>  03-26    PT/INR - ( 26 Mar 2020 06:29 )   PT: 14.7 sec;   INR: 1.29 ratio         PTT - ( 26 Mar 2020 06:29 )  PTT:29.9 sec        RADIOLOGY & ADDITIONAL STUDIES REVIEWED:  ***    [ x]GOALS OF CARE DISCUSSION WITH PATIENT/FAMILY/PROXY: full code     CRITICAL CARE TIME SPENT: 35 minutes

## 2020-03-27 NOTE — CHART NOTE - NSCHARTNOTEFT_GEN_A_CORE
64 yr old M admitted for hypoxic respiratory failure secondary to Covid Pneumonia with renal failure on HD, acute liver failure develop hypotension of SBP 70s and Tachycardia of 130s, Levophed was increased, NS bolus was given. Patient lost the pulse and code blue was called at 3:# 64 yr old M admitted for hypoxic respiratory failure secondary to Covid Pneumonia with renal failure on HD, acute liver failure develop hypotension of SBP 70s and Tachycardia of 130s, Levophed was increased, NS bolus was given. Patient lost the pulse and code blue was called at 3:36 am, ACLS activated, Patient had 3 rounds of CPR, initial rhythm was pulse less V tach, had 3 rounds for Shocks (150J, 200J & 200 J) , ROSC was achieved but within few seconds patient lost the pulse again. Patient got total of 4 mg of epi, 2 doses of amiodarone, 1 mg of Magnesium, 1 calcium gluconate. ROSC achievedat 3:50 am (Total of 15 min). After ROSC, patient had pulseless V tach, was given synchronized cardioversion x1, given Amiodarone push. V tachycardia mildly improved.    BMP lab result drawn that showed Hyperkalemia of K: 7.8 with EKG finding of Peaked T waves and SIne waves in all precordial leads, Patient was given Insulin, D10 64 yr old M admitted for hypoxic respiratory failure secondary to Covid Pneumonia with renal failure on HD, acute liver failure develop hypotension of SBP 70s and Tachycardia of 130s, Levophed was increased, NS bolus was given. Patient lost the pulse and code blue was called at 3:36 am, ACLS activated, Patient had 3 rounds of CPR, initial rhythm was pulse less V tach, had 3 rounds for Shocks (150J, 200J & 200 J) , ROSC was achieved but within few seconds patient lost the pulse again. Patient got total of 4 mg of epi, 2 doses of amiodarone, 1 mg of Magnesium, 1 calcium gluconate. ROSC achievedat 3:50 am (Total of 15 min). After ROSC, patient had pulseless V tach, was given synchronized cardioversion x1, given Amiodarone push. V tachycardia mildly improved.    BMP lab result drawn that showed Hyperkalemia of K: 7.8 with EKG finding of Peaked T waves and Sine waves in all precordial leads, Patient was given Insulin, D10, 3 Amps of bicarbonate, Patient is also hypotensive started on Levophed, Pheny and vasopressin,    Discussed with Nephrologist Dr. Rodriguez, recommended patient cannot get dialysis as he is severely hypotensive, recommended to give kayaxelate, Sodium Bicarb x 3 IV pushes now and once SBP >100, will try for dialysis.

## 2020-03-27 NOTE — GOALS OF CARE CONVERSATION - ADVANCED CARE PLANNING - CONVERSATION DETAILS
Discussed in detail regarding patient's poor prognosis, cardiac arrest, severe hyperkalmia. Discussed goals of care including CPR again. Explained that patient achieved ROSC after 20 min of lack perfusion, but there is high chance patient will have cardiac arrest  Risk and benefits of CPR discussed. After full understanding, daughters opted him to be DNR. But otherwise do all other measures

## 2020-03-27 NOTE — DISCHARGE NOTE FOR THE EXPIRED PATIENT - HOSPITAL COURSE
64 yr old M was admitted for hypoxic respiratory failure secondary to Covid Pneumonia then developed renal failure that needed Hemodialysis  , acute liver failure with hypotension  Patient lost the pulse and code blue was called  Patient had 3 rounds of CPR and had 3 rounds for Shocks . ROSC was achieved .lab result drawn that showed Hyperkalemia of K: 7.8 with EKG finding of Peaked T waves and Sine waves in all precordial leads, Patient was given Insulin, D10, 3 Amps of bicarbonate, Patient is also hypotensive started on Levophed, Pheny and vasopressin,  Discussed with Nephrologist Dr. Rodriguez, recommended patient cannot get dialysis as he is severely hypotensive,   family was contacted and pt became DNR . At 5:18 am on 9/21/2018 show flat EKG line on monitor and went to the bedside to see the pt, family was present at the bedside, pt was non responsive to verbal commands or physical stimuli, carotid and radial pulse were absent, pupils were dilated and fixed, no heart sounds were present on ascultation, announced patient dead at 5:20 am on 9/21/2018. Family was informed. Family declined autopsy. Condolences were given.       For full account of hospital course please refer to actual medical records. As this is a brief summery. 64 yr old M was admitted for hypoxic respiratory failure secondary to Covid Pneumonia then developed renal failure that needed Hemodialysis  , acute liver failure with hypotension  Patient lost the pulse and code blue was called  Patient had 3 rounds of CPR and had 3 rounds for Shocks . ROSC was achieved .lab result drawn that showed Hyperkalemia of K: 7.8 with EKG finding of Peaked T waves and Sine waves in all precordial leads, Patient was given Insulin, D10, 3 Amps of bicarbonate, Patient is also hypotensive started on Levophed, Pheny and vasopressin,  Discussed with Nephrologist Dr. Rodriguez, recommended patient cannot get dialysis as he is severely hypotensive,   family was contacted and pt became DNR . At 6:00 AM on 3/27/2020 monitor showed flat EKG line and went to the bedside to see the pt, pt was non responsive to verbal commands or physical stimuli, carotid and radial pulse were absent, pupils were dilated and fixed, no heart sounds were present on ascultation, announced patient dead at 6:03 am on 3/27/2020. Family was informed. Family declined autopsy. Condolences were given.     For full account of hospital course please refer to actual medical records. As this is a brief summery.

## 2020-03-27 NOTE — CHART NOTE - NSCHARTNOTEFT_GEN_A_CORE
Notified patient's daughter of cardiac arrest and subsequent ROSC. Explained to Cristiana that patient has a high risk of recurrent arrest. She maintains he is to be FULL CODE. All questions have been answered at this time.

## 2020-03-27 NOTE — DISCHARGE NOTE FOR THE EXPIRED PATIENT - NS PRELIMINARY CAUSE OF DEATH_RESTRICTED
Call received from [facility] for NO PCP patient needing preop for DOS 10/3/2017cystoscopy with urethral dilation with Dr Garcia at [facility].    Call placed to patient via [facility] Control ID #[id number] Sherwin (?) to schedule preop appt for 9/25/2017 with Dr Bautista [facility].  Patient verbalized understanding.   Cardiopulmonary Arrest

## 2020-03-29 LAB
HOMOCYSTEINE LEVEL: 16.3 UMOL/L — HIGH
METHYLMALONIC ACID LEVEL: 334 NMOL/L — HIGH (ref 87–318)
